# Patient Record
Sex: FEMALE | Race: WHITE | NOT HISPANIC OR LATINO | URBAN - METROPOLITAN AREA
[De-identification: names, ages, dates, MRNs, and addresses within clinical notes are randomized per-mention and may not be internally consistent; named-entity substitution may affect disease eponyms.]

---

## 2023-08-31 ENCOUNTER — HOSPITAL ENCOUNTER (OUTPATIENT)
Dept: DATA CONVERSION | Facility: HOSPITAL | Age: 38
End: 2023-08-31

## 2023-08-31 DIAGNOSIS — R92.2 INCONCLUSIVE MAMMOGRAM: ICD-10-CM

## 2023-08-31 DIAGNOSIS — Z91.89 OTHER SPECIFIED PERSONAL RISK FACTORS, NOT ELSEWHERE CLASSIFIED: ICD-10-CM

## 2023-09-17 ENCOUNTER — HOSPITAL ENCOUNTER (OUTPATIENT)
Dept: DATA CONVERSION | Facility: HOSPITAL | Age: 38
Discharge: HOME | End: 2023-09-17

## 2023-09-17 DIAGNOSIS — K62.89 OTHER SPECIFIED DISEASES OF ANUS AND RECTUM: ICD-10-CM

## 2023-09-17 PROBLEM — F31.9 BIPOLAR AFFECTIVE DISORDER (MULTI): Status: ACTIVE | Noted: 2023-06-05

## 2023-09-17 PROBLEM — I20.9 ANGINA PECTORIS (CMS-HCC): Status: ACTIVE | Noted: 2023-04-06

## 2023-09-17 PROBLEM — F41.9 ANXIETY DISORDER, UNSPECIFIED: Status: ACTIVE | Noted: 2023-09-17

## 2023-09-17 PROBLEM — T50.902A SUICIDE BY DRUG OVERDOSE (MULTI): Status: ACTIVE | Noted: 2023-09-17

## 2023-09-17 PROBLEM — R20.2 PARESTHESIAS IN RIGHT HAND: Status: ACTIVE | Noted: 2023-06-05

## 2023-09-17 PROBLEM — M62.81 MUSCLE WEAKNESS OF RIGHT UPPER EXTREMITY: Status: ACTIVE | Noted: 2023-06-05

## 2023-09-17 PROBLEM — F31.70 BIPOLAR DISORDER IN FULL REMISSION (MULTI): Status: ACTIVE | Noted: 2023-09-17

## 2023-09-17 PROBLEM — M54.2 CERVICALGIA: Status: ACTIVE | Noted: 2023-06-05

## 2023-09-17 PROBLEM — F32.A DEPRESSION, UNSPECIFIED: Status: ACTIVE | Noted: 2023-09-17

## 2023-09-17 PROBLEM — T14.8XXA CONTUSION: Status: RESOLVED | Noted: 2023-09-17 | Resolved: 2023-09-17

## 2023-09-17 LAB
ALBUMIN SERPL-MCNC: 4.4 GM/DL (ref 3.5–5)
ALBUMIN/GLOB SERPL: 1.5 RATIO (ref 1.5–3)
ALP BLD-CCNC: 65 U/L (ref 35–125)
ALT SERPL-CCNC: 16 U/L (ref 5–40)
ANION GAP SERPL CALCULATED.3IONS-SCNC: 9 MMOL/L (ref 0–19)
AST SERPL-CCNC: 17 U/L (ref 5–40)
BACTERIA UR QL AUTO: POSITIVE
BASOPHILS # BLD AUTO: 0.05 K/UL (ref 0–0.22)
BASOPHILS NFR BLD AUTO: 0.6 % (ref 0–1)
BILIRUB SERPL-MCNC: 0.3 MG/DL (ref 0.1–1.2)
BILIRUB UR QL STRIP.AUTO: NEGATIVE
BUN SERPL-MCNC: 9 MG/DL (ref 8–25)
BUN/CREAT SERPL: 12.9 RATIO (ref 8–21)
CALCIUM SERPL-MCNC: 9.3 MG/DL (ref 8.5–10.4)
CHLORIDE SERPL-SCNC: 102 MMOL/L (ref 97–107)
CLARITY UR: ABNORMAL
CO2 SERPL-SCNC: 26 MMOL/L (ref 24–31)
COLOR UR: ABNORMAL
CREAT SERPL-MCNC: 0.7 MG/DL (ref 0.4–1.6)
DEPRECATED RDW RBC AUTO: 42 FL (ref 37–54)
DIFFERENTIAL METHOD BLD: ABNORMAL
EOSINOPHIL # BLD AUTO: 0.19 K/UL (ref 0–0.45)
EOSINOPHIL NFR BLD: 2.4 % (ref 0–3)
ERYTHROCYTE [DISTWIDTH] IN BLOOD BY AUTOMATED COUNT: 12.6 % (ref 11.7–15)
GFR SERPL CREATININE-BSD FRML MDRD: 115 ML/MIN/1.73 M2
GLOBULIN SER-MCNC: 2.9 G/DL (ref 1.9–3.7)
GLUCOSE SERPL-MCNC: 95 MG/DL (ref 65–99)
GLUCOSE UR STRIP.AUTO-MCNC: NEGATIVE MG/DL
HCG UR QL: NEGATIVE
HCT VFR BLD AUTO: 43.8 % (ref 36–44)
HGB BLD-MCNC: 15.2 GM/DL (ref 12–15)
HGB UR QL STRIP.AUTO: 5 /HPF (ref 0–3)
HGB UR QL: NEGATIVE
HYALINE CASTS UR QL AUTO: ABNORMAL /LPF
IMM GRANULOCYTES # BLD AUTO: 0.02 K/UL (ref 0–0.1)
KETONES UR QL STRIP.AUTO: NEGATIVE
LEUKOCYTE ESTERASE UR QL STRIP.AUTO: NEGATIVE
LYMPHOCYTES # BLD AUTO: 2.61 K/UL (ref 1.2–3.2)
LYMPHOCYTES NFR BLD MANUAL: 32.6 % (ref 20–40)
MCH RBC QN AUTO: 31.7 PG (ref 26–34)
MCHC RBC AUTO-ENTMCNC: 34.7 % (ref 31–37)
MCV RBC AUTO: 91.3 FL (ref 80–100)
MICRO URNS: ABNORMAL
MICROSCOPIC (UA): ABNORMAL
MONOCYTES # BLD AUTO: 0.64 K/UL (ref 0–0.8)
MONOCYTES NFR BLD MANUAL: 8 % (ref 0–8)
NEUTROPHILS # BLD AUTO: 4.5 K/UL
NEUTROPHILS # BLD AUTO: 4.5 K/UL (ref 1.8–7.7)
NEUTROPHILS.IMMATURE NFR BLD: 0.2 % (ref 0–1)
NEUTS SEG NFR BLD: 56.2 % (ref 50–70)
NITRITE UR QL STRIP.AUTO: NEGATIVE
NRBC BLD-RTO: 0 /100 WBC
PH UR STRIP.AUTO: 6.5 [PH] (ref 4.6–8)
PLATELET # BLD AUTO: 183 K/UL (ref 150–450)
PMV BLD AUTO: 10.1 CU (ref 7–12.6)
POTASSIUM SERPL-SCNC: 4.2 MMOL/L (ref 3.4–5.1)
PROT SERPL-MCNC: 7.3 G/DL (ref 5.9–7.9)
PROT UR STRIP.AUTO-MCNC: NEGATIVE MG/DL
RBC # BLD AUTO: 4.8 M/UL (ref 4–4.9)
SODIUM SERPL-SCNC: 137 MMOL/L (ref 133–145)
SP GR UR STRIP.AUTO: 1.01 (ref 1–1.03)
SQUAMOUS UR QL AUTO: ABNORMAL /HPF
URINE CULTURE: ABNORMAL
UROBILINOGEN UR QL STRIP.AUTO: NORMAL MG/DL (ref 0–1)
WBC # BLD AUTO: 8 K/UL (ref 4.5–11)
WBC #/AREA URNS AUTO: 3 /HPF (ref 0–3)

## 2023-09-17 RX ORDER — ETONOGESTREL 68 MG/1
IMPLANT SUBCUTANEOUS
COMMUNITY
Start: 2022-07-26 | End: 2025-07-26

## 2023-09-17 RX ORDER — CALCIUM CARBONATE 500(1250)
1 TABLET ORAL
COMMUNITY

## 2023-09-17 RX ORDER — TURMERIC ROOT EXTRACT 500 MG
TABLET ORAL
COMMUNITY

## 2023-09-17 RX ORDER — BIOTIN 10 MG
1 TABLET ORAL DAILY
COMMUNITY

## 2023-09-17 RX ORDER — ZINC GLUCONATE 100 MG
1 TABLET ORAL DAILY
COMMUNITY

## 2023-09-17 RX ORDER — GLUCOSAM/CHONDRO/HERB 149/HYAL 750-100 MG
1 TABLET ORAL DAILY
COMMUNITY

## 2023-09-17 RX ORDER — CALCIUM CARBONATE 600 MG
1200 TABLET ORAL 3 TIMES DAILY
COMMUNITY
Start: 2023-04-06

## 2023-09-18 ENCOUNTER — HOSPITAL ENCOUNTER (OUTPATIENT)
Dept: DATA CONVERSION | Facility: HOSPITAL | Age: 38
Discharge: HOME | End: 2023-09-18

## 2023-09-18 DIAGNOSIS — R33.9 RETENTION OF URINE, UNSPECIFIED: ICD-10-CM

## 2023-09-18 DIAGNOSIS — F17.210 NICOTINE DEPENDENCE, CIGARETTES, UNCOMPLICATED: ICD-10-CM

## 2023-09-18 DIAGNOSIS — K62.89 OTHER SPECIFIED DISEASES OF ANUS AND RECTUM: ICD-10-CM

## 2023-09-18 DIAGNOSIS — K60.2 ANAL FISSURE, UNSPECIFIED: ICD-10-CM

## 2023-09-18 LAB
BACTERIA UR QL AUTO: NEGATIVE
BILIRUB UR QL STRIP.AUTO: NEGATIVE
C TRACH RRNA SPEC QL NAA+PROBE: NORMAL
CLARITY UR: CLEAR
COLOR UR: YELLOW
DRUG SCREEN COMMENT UR-IMP: NORMAL
GLUCOSE UR STRIP.AUTO-MCNC: NEGATIVE MG/DL
HCG UR QL: NEGATIVE
HGB UR QL STRIP.AUTO: 2 /HPF (ref 0–3)
HGB UR QL: NEGATIVE
HYALINE CASTS UR QL AUTO: ABNORMAL /LPF
KETONES UR QL STRIP.AUTO: ABNORMAL
LEUKOCYTE ESTERASE UR QL STRIP.AUTO: NEGATIVE
MICROSCOPIC (UA): ABNORMAL
N GONORRHOEA RRNA SPEC QL NAA+PROBE: NORMAL
NITRITE UR QL STRIP.AUTO: NEGATIVE
PH UR STRIP.AUTO: 5.5 [PH] (ref 4.6–8)
PROT UR STRIP.AUTO-MCNC: NEGATIVE MG/DL
SP GR UR STRIP.AUTO: 1.02 (ref 1–1.03)
SPECIMEN SOURCE: NORMAL
SQUAMOUS UR QL AUTO: ABNORMAL /HPF
T VAGINALIS GENITAL QL WET PREP: NEGATIVE
T VAGINALIS GENITAL QL WET PREP: NEGATIVE
URINE CULTURE: ABNORMAL
UROBILINOGEN UR QL STRIP.AUTO: NORMAL MG/DL (ref 0–1)
WBC #/AREA URNS AUTO: 1 /HPF (ref 0–3)
WBC VAG QL WET PREP: ABNORMAL
YEAST GENITAL QL WET PREP: NEGATIVE

## 2023-09-22 ENCOUNTER — HOSPITAL ENCOUNTER (OUTPATIENT)
Dept: DATA CONVERSION | Facility: HOSPITAL | Age: 38
Discharge: HOME | End: 2023-09-22

## 2023-09-22 DIAGNOSIS — K64.9 UNSPECIFIED HEMORRHOIDS: ICD-10-CM

## 2023-09-22 DIAGNOSIS — K60.2 ANAL FISSURE, UNSPECIFIED: ICD-10-CM

## 2023-09-22 DIAGNOSIS — F17.210 NICOTINE DEPENDENCE, CIGARETTES, UNCOMPLICATED: ICD-10-CM

## 2023-09-22 LAB — HCG SERPL-ACNC: 1 MIU/ML

## 2023-10-11 ENCOUNTER — APPOINTMENT (OUTPATIENT)
Dept: PAIN MEDICINE | Facility: CLINIC | Age: 38
End: 2023-10-11

## 2023-10-20 ENCOUNTER — APPOINTMENT (OUTPATIENT)
Dept: SURGERY | Facility: CLINIC | Age: 38
End: 2023-10-20

## 2025-01-14 ENCOUNTER — APPOINTMENT (OUTPATIENT)
Dept: ORTHOPEDIC SURGERY | Facility: CLINIC | Age: 40
End: 2025-01-14
Payer: MEDICAID

## 2025-01-14 DIAGNOSIS — M53.3 TRAUMATIC COCCYDYNIA: Primary | ICD-10-CM

## 2025-01-14 PROCEDURE — 99204 OFFICE O/P NEW MOD 45 MIN: CPT

## 2025-01-14 NOTE — PROGRESS NOTES
HPI:  Becca Joshi is a 39-year-old female who was referred here by urgent care for a coccyx fracture.  1 week ago she fell on the stairs and landed on her buttocks.  She then presented to urgent care and was diagnosed with a coccyx fracture.  Currently, she describes soreness, some tingling, and some burning around her coccyx.  No pain or numbness and tingling radiating into the legs.  She is ambulating and tolerating walking well.  She has been using over-the-counter medications to treat the pain.  She does have a donut pillow at home.  No other questions or concerns at time of visit.    ROS:  Reviewed on EMR and patient intake sheet.    PMH/SH:  Reviewed on EMR and patient intake sheet.    Exam:  MSK: Full strength range of motion of lower extremities bilaterally.  Full flexion, extension of lumbar spine with minimal pain.  No acute tenderness to lower lumbar spine, sacrum, coccyx which elicits a sharp pain.  General: No acute distress. Awake and conversant.  Eyes: Normal conjunctiva, anicteric. Round symmetric pupils.  ENT: Hearing grossly intact. No nasal discharge.  Neck: Neck is supple. No masses or thyromegaly.  Respiratory: Respirations are non-labored. No wheezing.  Skin: Warm. No rashes or ulcers.  Psych: Alert and oriented. Cooperative, appropriate mood and affect, normal judgement.  CV: No lower extremity edema.  Neuro: Sensation and CN II-XII grossly normal.    Radiology:     X-rays of sacrum/coccyx from 1/5/2025 were uploaded and personally reviewed.  No obvious signs of sacrum or coccyx fracture identified.    Diagnosis:    Coccydynia  Coccyx contusion    Assessment and Plan:  Patient was seen today evaluated for a potential coccyx fracture.  She was referred here by urgent care.  At this time, based on her x-rays and physical exam findings, I believe she has a contusion/strain of the coccyx region.  We discussed conservative management of ambulating as tolerated, use of over-the-counter  anti-inflammatories and Tylenol, use of a donut pillow, and frequent ambulation when sitting for long periods of time.  I educated the patient that these slowly improve over time, often after 4 to 6 weeks.  She may slowly resume activity and exercise as tolerated.  She was recommended to follow-up sooner if her pain becomes more intense, she has an inability to sit, stand, ambulate, or if she is in debilitating pain despite oral analgesics.  Patient feels her questions were answered today.  Patient agrees to the plan above.    **This note was dictated using speech recognition software and was not corrected for spelling or grammatical errors**    Elieser Lea PA-C  Department of Orthopaedic Surgery  8:56 AM  01/14/25    [address] [place] [address]    Voicemail: [phone]   Appts: [phone]  Fax: [phone]

## 2025-07-31 ENCOUNTER — APPOINTMENT (OUTPATIENT)
Dept: CARDIOLOGY | Facility: HOSPITAL | Age: 40
End: 2025-07-31
Payer: MEDICAID

## 2025-07-31 ENCOUNTER — HOSPITAL ENCOUNTER (INPATIENT)
Facility: HOSPITAL | Age: 40
End: 2025-07-31
Attending: PSYCHIATRY & NEUROLOGY | Admitting: PSYCHIATRY & NEUROLOGY
Payer: MEDICAID

## 2025-07-31 ENCOUNTER — HOSPITAL ENCOUNTER (EMERGENCY)
Facility: HOSPITAL | Age: 40
Discharge: PSYCHIATRIC OR SUBSTANCE USE FACILITY | End: 2025-07-31
Attending: EMERGENCY MEDICINE
Payer: MEDICAID

## 2025-07-31 VITALS
TEMPERATURE: 99.1 F | OXYGEN SATURATION: 99 % | RESPIRATION RATE: 18 BRPM | DIASTOLIC BLOOD PRESSURE: 71 MMHG | HEART RATE: 98 BPM | SYSTOLIC BLOOD PRESSURE: 118 MMHG | WEIGHT: 150 LBS | BODY MASS INDEX: 26.58 KG/M2 | HEIGHT: 63 IN

## 2025-07-31 DIAGNOSIS — N30.90 BLADDER INFECTION: ICD-10-CM

## 2025-07-31 DIAGNOSIS — F32.A DEPRESSION, UNSPECIFIED DEPRESSION TYPE: ICD-10-CM

## 2025-07-31 DIAGNOSIS — R45.851 SUICIDAL IDEATION: Primary | ICD-10-CM

## 2025-07-31 DIAGNOSIS — Z00.00 HEALTHCARE MAINTENANCE: ICD-10-CM

## 2025-07-31 DIAGNOSIS — F17.200 TOBACCO USE DISORDER: Primary | ICD-10-CM

## 2025-07-31 PROBLEM — R82.71 ASYMPTOMATIC BACTERIURIA: Status: RESOLVED | Noted: 2025-07-31 | Resolved: 2025-07-31

## 2025-07-31 PROBLEM — L91.0 KELOID OF SKIN: Status: ACTIVE | Noted: 2024-04-16

## 2025-07-31 PROBLEM — R82.71 ASYMPTOMATIC BACTERIURIA: Status: ACTIVE | Noted: 2025-07-31

## 2025-07-31 PROBLEM — F31.32 BIPOLAR AFFECTIVE DISORDER, CURRENTLY DEPRESSED, MODERATE (MULTI): Chronic | Status: ACTIVE | Noted: 2023-06-05

## 2025-07-31 PROBLEM — N30.00 ACUTE CYSTITIS WITHOUT HEMATURIA: Status: ACTIVE | Noted: 2025-07-31

## 2025-07-31 PROBLEM — K59.09 CHRONIC CONSTIPATION: Status: ACTIVE | Noted: 2023-12-14

## 2025-07-31 PROBLEM — F12.10 CANNABIS USE DISORDER, MILD: Status: ACTIVE | Noted: 2025-04-24

## 2025-07-31 PROBLEM — R20.2 INTERMITTENT PARESTHESIA OF HAND AND FOOT: Status: ACTIVE | Noted: 2024-07-03

## 2025-07-31 PROBLEM — K60.2 ANAL FISSURE: Status: ACTIVE | Noted: 2025-07-31

## 2025-07-31 PROBLEM — J30.2 SEASONAL ALLERGIES: Status: ACTIVE | Noted: 2025-05-13

## 2025-07-31 PROBLEM — F90.2 ATTENTION DEFICIT HYPERACTIVITY DISORDER (ADHD), COMBINED TYPE: Status: ACTIVE | Noted: 2025-04-24

## 2025-07-31 PROBLEM — R45.89 AT RISK FOR SUICIDE: Status: ACTIVE | Noted: 2025-06-11

## 2025-07-31 PROBLEM — Z97.5 IUD (INTRAUTERINE DEVICE) IN PLACE: Status: ACTIVE | Noted: 2024-04-16

## 2025-07-31 LAB
ALBUMIN SERPL BCP-MCNC: 4.5 G/DL (ref 3.4–5)
ALP SERPL-CCNC: 41 U/L (ref 33–110)
ALT SERPL W P-5'-P-CCNC: 9 U/L (ref 7–45)
AMPHETAMINES UR QL SCN: ABNORMAL
ANION GAP SERPL CALC-SCNC: 14 MMOL/L (ref 10–20)
APAP SERPL-MCNC: <10 UG/ML (ref ?–30)
APPEARANCE UR: CLEAR
AST SERPL W P-5'-P-CCNC: 13 U/L (ref 9–39)
B-HCG SERPL-ACNC: <2 MIU/ML
BACTERIA #/AREA URNS AUTO: ABNORMAL /HPF
BARBITURATES UR QL SCN: ABNORMAL
BASOPHILS # BLD AUTO: 0.04 X10*3/UL (ref 0–0.1)
BASOPHILS NFR BLD AUTO: 0.4 %
BENZODIAZ UR QL SCN: ABNORMAL
BILIRUB SERPL-MCNC: 0.7 MG/DL (ref 0–1.2)
BILIRUB UR STRIP.AUTO-MCNC: NEGATIVE MG/DL
BUN SERPL-MCNC: 9 MG/DL (ref 6–23)
BZE UR QL SCN: ABNORMAL
CALCIUM SERPL-MCNC: 9 MG/DL (ref 8.6–10.3)
CANNABINOIDS UR QL SCN: ABNORMAL
CHLORIDE SERPL-SCNC: 106 MMOL/L (ref 98–107)
CO2 SERPL-SCNC: 23 MMOL/L (ref 21–32)
COLOR UR: YELLOW
CREAT SERPL-MCNC: 0.89 MG/DL (ref 0.5–1.05)
EGFRCR SERPLBLD CKD-EPI 2021: 86 ML/MIN/1.73M*2
EOSINOPHIL # BLD AUTO: 0.11 X10*3/UL (ref 0–0.7)
EOSINOPHIL NFR BLD AUTO: 1 %
ERYTHROCYTE [DISTWIDTH] IN BLOOD BY AUTOMATED COUNT: 11.9 % (ref 11.5–14.5)
ETHANOL SERPL-MCNC: <10 MG/DL
FENTANYL+NORFENTANYL UR QL SCN: ABNORMAL
GLUCOSE SERPL-MCNC: 107 MG/DL (ref 74–99)
GLUCOSE UR STRIP.AUTO-MCNC: NORMAL MG/DL
HCT VFR BLD AUTO: 41.6 % (ref 36–46)
HGB BLD-MCNC: 14.2 G/DL (ref 12–16)
IMM GRANULOCYTES # BLD AUTO: 0.06 X10*3/UL (ref 0–0.7)
IMM GRANULOCYTES NFR BLD AUTO: 0.5 % (ref 0–0.9)
KETONES UR STRIP.AUTO-MCNC: ABNORMAL MG/DL
LEUKOCYTE ESTERASE UR QL STRIP.AUTO: ABNORMAL
LYMPHOCYTES # BLD AUTO: 2.19 X10*3/UL (ref 1.2–4.8)
LYMPHOCYTES NFR BLD AUTO: 19.6 %
MCH RBC QN AUTO: 30.7 PG (ref 26–34)
MCHC RBC AUTO-ENTMCNC: 34.1 G/DL (ref 32–36)
MCV RBC AUTO: 90 FL (ref 80–100)
METHADONE UR QL SCN: ABNORMAL
MONOCYTES # BLD AUTO: 0.79 X10*3/UL (ref 0.1–1)
MONOCYTES NFR BLD AUTO: 7.1 %
NEUTROPHILS # BLD AUTO: 7.98 X10*3/UL (ref 1.2–7.7)
NEUTROPHILS NFR BLD AUTO: 71.4 %
NITRITE UR QL STRIP.AUTO: NEGATIVE
NRBC BLD-RTO: 0 /100 WBCS (ref 0–0)
OPIATES UR QL SCN: ABNORMAL
OXYCODONE+OXYMORPHONE UR QL SCN: ABNORMAL
PCP UR QL SCN: ABNORMAL
PH UR STRIP.AUTO: 6 [PH]
PLATELET # BLD AUTO: 200 X10*3/UL (ref 150–450)
POTASSIUM SERPL-SCNC: 3.6 MMOL/L (ref 3.5–5.3)
PROT SERPL-MCNC: 6.8 G/DL (ref 6.4–8.2)
PROT UR STRIP.AUTO-MCNC: ABNORMAL MG/DL
RBC # BLD AUTO: 4.63 X10*6/UL (ref 4–5.2)
RBC # UR STRIP.AUTO: NEGATIVE MG/DL
RBC #/AREA URNS AUTO: ABNORMAL /HPF
SALICYLATES SERPL-MCNC: <3 MG/DL (ref ?–20)
SODIUM SERPL-SCNC: 139 MMOL/L (ref 136–145)
SP GR UR STRIP.AUTO: 1.03
SQUAMOUS #/AREA URNS AUTO: ABNORMAL /HPF
UROBILINOGEN UR STRIP.AUTO-MCNC: ABNORMAL MG/DL
WBC # BLD AUTO: 11.2 X10*3/UL (ref 4.4–11.3)
WBC #/AREA URNS AUTO: ABNORMAL /HPF

## 2025-07-31 PROCEDURE — 87086 URINE CULTURE/COLONY COUNT: CPT | Mod: PARLAB | Performed by: NURSE PRACTITIONER

## 2025-07-31 PROCEDURE — 80053 COMPREHEN METABOLIC PANEL: CPT | Performed by: NURSE PRACTITIONER

## 2025-07-31 PROCEDURE — 80307 DRUG TEST PRSMV CHEM ANLYZR: CPT | Performed by: NURSE PRACTITIONER

## 2025-07-31 PROCEDURE — 1240000001 HC SEMI-PRIVATE BH ROOM DAILY

## 2025-07-31 PROCEDURE — 85025 COMPLETE CBC W/AUTO DIFF WBC: CPT | Performed by: NURSE PRACTITIONER

## 2025-07-31 PROCEDURE — 80143 DRUG ASSAY ACETAMINOPHEN: CPT | Performed by: NURSE PRACTITIONER

## 2025-07-31 PROCEDURE — 99253 IP/OBS CNSLTJ NEW/EST LOW 45: CPT | Performed by: INTERNAL MEDICINE

## 2025-07-31 PROCEDURE — 84443 ASSAY THYROID STIM HORMONE: CPT

## 2025-07-31 PROCEDURE — 84702 CHORIONIC GONADOTROPIN TEST: CPT | Performed by: NURSE PRACTITIONER

## 2025-07-31 PROCEDURE — 81001 URINALYSIS AUTO W/SCOPE: CPT | Mod: 59 | Performed by: NURSE PRACTITIONER

## 2025-07-31 PROCEDURE — 93005 ELECTROCARDIOGRAM TRACING: CPT

## 2025-07-31 PROCEDURE — 2500000001 HC RX 250 WO HCPCS SELF ADMINISTERED DRUGS (ALT 637 FOR MEDICARE OP): Performed by: INTERNAL MEDICINE

## 2025-07-31 PROCEDURE — 2500000001 HC RX 250 WO HCPCS SELF ADMINISTERED DRUGS (ALT 637 FOR MEDICARE OP): Performed by: EMERGENCY MEDICINE

## 2025-07-31 PROCEDURE — 99285 EMERGENCY DEPT VISIT HI MDM: CPT | Performed by: EMERGENCY MEDICINE

## 2025-07-31 PROCEDURE — 36415 COLL VENOUS BLD VENIPUNCTURE: CPT | Performed by: NURSE PRACTITIONER

## 2025-07-31 RX ORDER — MIRTAZAPINE 7.5 MG/1
7.5 TABLET, FILM COATED ORAL NIGHTLY
COMMUNITY
Start: 2025-07-17

## 2025-07-31 RX ORDER — FLUTICASONE PROPIONATE 50 MCG
2 SPRAY, SUSPENSION (ML) NASAL
COMMUNITY
Start: 2025-05-01

## 2025-07-31 RX ORDER — LORAZEPAM 1 MG/1
2 TABLET ORAL EVERY 6 HOURS PRN
Status: DISCONTINUED | OUTPATIENT
Start: 2025-07-31 | End: 2025-08-04 | Stop reason: HOSPADM

## 2025-07-31 RX ORDER — DIPHENHYDRAMINE HCL 50 MG
50 CAPSULE ORAL EVERY 6 HOURS PRN
Status: DISCONTINUED | OUTPATIENT
Start: 2025-07-31 | End: 2025-08-04 | Stop reason: HOSPADM

## 2025-07-31 RX ORDER — DIPHENHYDRAMINE HYDROCHLORIDE 50 MG/ML
50 INJECTION, SOLUTION INTRAMUSCULAR; INTRAVENOUS ONCE AS NEEDED
Status: DISCONTINUED | OUTPATIENT
Start: 2025-07-31 | End: 2025-08-04 | Stop reason: HOSPADM

## 2025-07-31 RX ORDER — HALOPERIDOL LACTATE 5 MG/ML
5 INJECTION, SOLUTION INTRAMUSCULAR EVERY 6 HOURS PRN
Status: DISCONTINUED | OUTPATIENT
Start: 2025-07-31 | End: 2025-08-04 | Stop reason: HOSPADM

## 2025-07-31 RX ORDER — NICOTINE 7MG/24HR
1 PATCH, TRANSDERMAL 24 HOURS TRANSDERMAL DAILY
Status: DISCONTINUED | OUTPATIENT
Start: 2025-09-25 | End: 2025-08-04 | Stop reason: HOSPADM

## 2025-07-31 RX ORDER — DEXTROAMPHETAMINE SACCHARATE, AMPHETAMINE ASPARTATE MONOHYDRATE, DEXTROAMPHETAMINE SULFATE AND AMPHETAMINE SULFATE 5; 5; 5; 5 MG/1; MG/1; MG/1; MG/1
20 CAPSULE, EXTENDED RELEASE ORAL
COMMUNITY
Start: 2025-06-12

## 2025-07-31 RX ORDER — IBUPROFEN 600 MG/1
600 TABLET, FILM COATED ORAL EVERY 8 HOURS PRN
Status: DISCONTINUED | OUTPATIENT
Start: 2025-07-31 | End: 2025-08-04 | Stop reason: HOSPADM

## 2025-07-31 RX ORDER — HALOPERIDOL 5 MG/1
10 TABLET ORAL EVERY 6 HOURS PRN
Status: DISCONTINUED | OUTPATIENT
Start: 2025-07-31 | End: 2025-08-04 | Stop reason: HOSPADM

## 2025-07-31 RX ORDER — HALOPERIDOL 5 MG/1
5 TABLET ORAL EVERY 6 HOURS PRN
Status: DISCONTINUED | OUTPATIENT
Start: 2025-07-31 | End: 2025-08-04 | Stop reason: HOSPADM

## 2025-07-31 RX ORDER — ALUMINUM HYDROXIDE, MAGNESIUM HYDROXIDE, AND SIMETHICONE 1200; 120; 1200 MG/30ML; MG/30ML; MG/30ML
30 SUSPENSION ORAL EVERY 6 HOURS PRN
Status: DISCONTINUED | OUTPATIENT
Start: 2025-07-31 | End: 2025-08-04 | Stop reason: HOSPADM

## 2025-07-31 RX ORDER — LORAZEPAM 0.5 MG/1
1 TABLET ORAL ONCE
Status: COMPLETED | OUTPATIENT
Start: 2025-07-31 | End: 2025-07-31

## 2025-07-31 RX ORDER — MICONAZOLE NITRATE 2 %
2 CREAM (GRAM) TOPICAL EVERY 2 HOUR PRN
Status: DISCONTINUED | OUTPATIENT
Start: 2025-07-31 | End: 2025-08-04 | Stop reason: HOSPADM

## 2025-07-31 RX ORDER — MIDAZOLAM HYDROCHLORIDE 1 MG/ML
2 INJECTION, SOLUTION INTRAMUSCULAR; INTRAVENOUS EVERY 6 HOURS PRN
Status: DISCONTINUED | OUTPATIENT
Start: 2025-07-31 | End: 2025-08-04 | Stop reason: HOSPADM

## 2025-07-31 RX ORDER — MULTIVIT-MIN/IRON FUM/FOLIC AC 7.5 MG-4
1 TABLET ORAL DAILY
Status: ON HOLD | COMMUNITY
End: 2025-08-04

## 2025-07-31 RX ORDER — LORATADINE 10 MG/1
10 TABLET ORAL DAILY PRN
COMMUNITY
Start: 2025-05-01

## 2025-07-31 RX ORDER — FLUOXETINE 20 MG/1
20 CAPSULE ORAL
COMMUNITY
Start: 2025-06-12

## 2025-07-31 RX ORDER — ASCORBIC ACID 500 MG
500 TABLET ORAL DAILY
COMMUNITY

## 2025-07-31 RX ORDER — OLOPATADINE HYDROCHLORIDE 1 MG/ML
1 SOLUTION OPHTHALMIC 2 TIMES DAILY
COMMUNITY
Start: 2025-06-03

## 2025-07-31 RX ORDER — HYDROXYZINE HYDROCHLORIDE 25 MG/1
50 TABLET, FILM COATED ORAL EVERY 6 HOURS PRN
Status: DISCONTINUED | OUTPATIENT
Start: 2025-07-31 | End: 2025-08-01

## 2025-07-31 RX ORDER — ZINC GLUCONATE 50 MG
50 TABLET ORAL DAILY
COMMUNITY

## 2025-07-31 RX ORDER — OXYBUTYNIN CHLORIDE 5 MG/1
5 TABLET, EXTENDED RELEASE ORAL
COMMUNITY
Start: 2025-05-07

## 2025-07-31 RX ORDER — IBUPROFEN 200 MG
1 TABLET ORAL DAILY
Status: DISCONTINUED | OUTPATIENT
Start: 2025-09-11 | End: 2025-08-04 | Stop reason: HOSPADM

## 2025-07-31 RX ORDER — HALOPERIDOL LACTATE 5 MG/ML
10 INJECTION, SOLUTION INTRAMUSCULAR EVERY 6 HOURS PRN
Status: DISCONTINUED | OUTPATIENT
Start: 2025-07-31 | End: 2025-08-04 | Stop reason: HOSPADM

## 2025-07-31 RX ORDER — IBUPROFEN 200 MG
1 TABLET ORAL DAILY
Status: DISCONTINUED | OUTPATIENT
Start: 2025-07-31 | End: 2025-08-04 | Stop reason: HOSPADM

## 2025-07-31 RX ORDER — POLYETHYLENE GLYCOL 3350 17 G/17G
17 POWDER, FOR SOLUTION ORAL DAILY PRN
Status: DISCONTINUED | OUTPATIENT
Start: 2025-07-31 | End: 2025-08-04 | Stop reason: HOSPADM

## 2025-07-31 RX ORDER — CEFUROXIME AXETIL 250 MG/1
250 TABLET ORAL 2 TIMES DAILY
Status: DISCONTINUED | OUTPATIENT
Start: 2025-07-31 | End: 2025-08-04 | Stop reason: HOSPADM

## 2025-07-31 RX ORDER — HYDROXYZINE HYDROCHLORIDE 25 MG/1
25-50 TABLET, FILM COATED ORAL EVERY 6 HOURS PRN
Status: ON HOLD | COMMUNITY
Start: 2025-05-23 | End: 2025-08-01 | Stop reason: DRUGHIGH

## 2025-07-31 RX ADMIN — CEFUROXIME AXETIL 250 MG: 250 TABLET ORAL at 21:32

## 2025-07-31 RX ADMIN — LORAZEPAM 1 MG: 0.5 TABLET ORAL at 15:22

## 2025-07-31 SDOH — HEALTH STABILITY: MENTAL HEALTH: HAVE YOU ACTUALLY HAD ANY THOUGHTS OF KILLING YOURSELF?: NO

## 2025-07-31 SDOH — HEALTH STABILITY: MENTAL HEALTH: ACTIVE SUICIDAL IDEATION WITH SPECIFIC PLAN AND INTENT (PAST 1 MONTH): NO

## 2025-07-31 SDOH — ECONOMIC STABILITY: FOOD INSECURITY: WITHIN THE PAST 12 MONTHS, THE FOOD YOU BOUGHT JUST DIDN'T LAST AND YOU DIDN'T HAVE MONEY TO GET MORE.: NEVER TRUE

## 2025-07-31 SDOH — HEALTH STABILITY: MENTAL HEALTH: SLEEP PATTERN: OTHER (COMMENT)

## 2025-07-31 SDOH — HEALTH STABILITY: MENTAL HEALTH: BEHAVIORAL HEALTH(WDL): WITHIN DEFINED LIMITS

## 2025-07-31 SDOH — HEALTH STABILITY: PHYSICAL HEALTH: PATIENT ACTIVITY: SLEEPING

## 2025-07-31 SDOH — HEALTH STABILITY: MENTAL HEALTH
COGNITION: APPROPRIATE ATTENTION/CONCENTRATION;APPROPRIATE FOR DEVELOPMENTAL AGE;APPROPRIATE SAFETY AWARENESS;FOLLOWS COMMANDS

## 2025-07-31 SDOH — HEALTH STABILITY: MENTAL HEALTH: NON-SPECIFIC ACTIVE SUICIDAL THOUGHTS (PAST 1 MONTH): YES

## 2025-07-31 SDOH — ECONOMIC STABILITY: FOOD INSECURITY: WITHIN THE PAST 12 MONTHS, YOU WORRIED THAT YOUR FOOD WOULD RUN OUT BEFORE YOU GOT THE MONEY TO BUY MORE.: NEVER TRUE

## 2025-07-31 SDOH — HEALTH STABILITY: MENTAL HEALTH: WISH TO BE DEAD (PAST 1 MONTH): YES

## 2025-07-31 SDOH — HEALTH STABILITY: MENTAL HEALTH: SUICIDAL BEHAVIOR (3 MONTHS): NO

## 2025-07-31 SDOH — SOCIAL STABILITY: SOCIAL INSECURITY: ARE YOU MARRIED, WIDOWED, DIVORCED, SEPARATED, NEVER MARRIED, OR LIVING WITH A PARTNER?: NEVER MARRIED

## 2025-07-31 SDOH — HEALTH STABILITY: MENTAL HEALTH: BEHAVIORS/MOOD: CALM;COOPERATIVE;SLEEPING

## 2025-07-31 SDOH — ECONOMIC STABILITY: INCOME INSECURITY: IN THE PAST 12 MONTHS HAS THE ELECTRIC, GAS, OIL, OR WATER COMPANY THREATENED TO SHUT OFF SERVICES IN YOUR HOME?: NO

## 2025-07-31 SDOH — HEALTH STABILITY: MENTAL HEALTH
DO YOU FEEL STRESS - TENSE, RESTLESS, NERVOUS, OR ANXIOUS, OR UNABLE TO SLEEP AT NIGHT BECAUSE YOUR MIND IS TROUBLED ALL THE TIME - THESE DAYS?: RATHER MUCH

## 2025-07-31 SDOH — HEALTH STABILITY: PHYSICAL HEALTH
HOW OFTEN DO YOU NEED TO HAVE SOMEONE HELP YOU WHEN YOU READ INSTRUCTIONS, PAMPHLETS, OR OTHER WRITTEN MATERIAL FROM YOUR DOCTOR OR PHARMACY?: NEVER

## 2025-07-31 SDOH — SOCIAL STABILITY: SOCIAL INSECURITY: HAVE YOU HAD THOUGHTS OF HARMING ANYONE ELSE?: NO

## 2025-07-31 SDOH — HEALTH STABILITY: MENTAL HEALTH: EXPERIENCED ANY OF THE FOLLOWING LIFE EVENTS: SOCIAL LOSS (BANKRUPTCY, DIVORCE, WORK-RELATED STRESS)

## 2025-07-31 SDOH — SOCIAL STABILITY: SOCIAL NETWORK
DO YOU BELONG TO ANY CLUBS OR ORGANIZATIONS SUCH AS CHURCH GROUPS, UNIONS, FRATERNAL OR ATHLETIC GROUPS, OR SCHOOL GROUPS?: NO

## 2025-07-31 SDOH — SOCIAL STABILITY: SOCIAL INSECURITY: DO YOU FEEL ANYONE HAS EXPLOITED OR TAKEN ADVANTAGE OF YOU FINANCIALLY OR OF YOUR PERSONAL PROPERTY?: NO

## 2025-07-31 SDOH — HEALTH STABILITY: PHYSICAL HEALTH: ON AVERAGE, HOW MANY MINUTES DO YOU ENGAGE IN EXERCISE AT THIS LEVEL?: 10 MIN

## 2025-07-31 SDOH — HEALTH STABILITY: MENTAL HEALTH: BEHAVIORS/MOOD: CALM;COOPERATIVE

## 2025-07-31 SDOH — ECONOMIC STABILITY: HOUSING INSECURITY: AT ANY TIME IN THE PAST 12 MONTHS, WERE YOU HOMELESS OR LIVING IN A SHELTER (INCLUDING NOW)?: NO

## 2025-07-31 SDOH — HEALTH STABILITY: MENTAL HEALTH: HOW MANY DRINKS CONTAINING ALCOHOL DO YOU HAVE ON A TYPICAL DAY WHEN YOU ARE DRINKING?: PATIENT DOES NOT DRINK

## 2025-07-31 SDOH — SOCIAL STABILITY: SOCIAL INSECURITY
WITHIN THE LAST YEAR, HAVE YOU BEEN RAPED OR FORCED TO HAVE ANY KIND OF SEXUAL ACTIVITY BY YOUR PARTNER OR EX-PARTNER?: NO

## 2025-07-31 SDOH — HEALTH STABILITY: MENTAL HEALTH

## 2025-07-31 SDOH — HEALTH STABILITY: MENTAL HEALTH: HAVE YOU WISHED YOU WERE DEAD OR WISHED YOU COULD GO TO SLEEP AND NOT WAKE UP?: NO

## 2025-07-31 SDOH — HEALTH STABILITY: PHYSICAL HEALTH: PATIENT ACTIVITY: AWAKE

## 2025-07-31 SDOH — HEALTH STABILITY: MENTAL HEALTH: HOW OFTEN DO YOU HAVE SIX OR MORE DRINKS ON ONE OCCASION?: NEVER

## 2025-07-31 SDOH — HEALTH STABILITY: MENTAL HEALTH: ACTIVE SUICIDAL IDEATION WITH SOME INTENT TO ACT, WITHOUT SPECIFIC PLAN (PAST 1 MONTH): NO

## 2025-07-31 SDOH — HEALTH STABILITY: MENTAL HEALTH: HAVE YOU EVER DONE ANYTHING, STARTED TO DO ANYTHING, OR PREPARED TO DO ANYTHING TO END YOUR LIFE?: NO

## 2025-07-31 SDOH — HEALTH STABILITY: MENTAL HEALTH: BEHAVIORS/MOOD: SLEEPING

## 2025-07-31 SDOH — SOCIAL STABILITY: SOCIAL INSECURITY
WITHIN THE LAST YEAR, HAVE YOU BEEN KICKED, HIT, SLAPPED, OR OTHERWISE PHYSICALLY HURT BY YOUR PARTNER OR EX-PARTNER?: NO

## 2025-07-31 SDOH — HEALTH STABILITY: MENTAL HEALTH: HAVE YOU EVER TRIED TO KILL YOURSELF?: YES

## 2025-07-31 SDOH — SOCIAL STABILITY: SOCIAL INSECURITY: WITHIN THE LAST YEAR, HAVE YOU BEEN HUMILIATED OR EMOTIONALLY ABUSED IN OTHER WAYS BY YOUR PARTNER OR EX-PARTNER?: NO

## 2025-07-31 SDOH — SOCIAL STABILITY: SOCIAL INSECURITY: WITHIN THE LAST YEAR, HAVE YOU BEEN AFRAID OF YOUR PARTNER OR EX-PARTNER?: NO

## 2025-07-31 SDOH — HEALTH STABILITY: MENTAL HEALTH: SUICIDAL BEHAVIOR (LIFETIME): YES

## 2025-07-31 SDOH — HEALTH STABILITY: MENTAL HEALTH: HOW DID YOU TRY TO KILL YOURSELF?: OVERDOSE

## 2025-07-31 SDOH — HEALTH STABILITY: PHYSICAL HEALTH: ON AVERAGE, HOW MANY DAYS PER WEEK DO YOU ENGAGE IN MODERATE TO STRENUOUS EXERCISE (LIKE A BRISK WALK)?: 5 DAYS

## 2025-07-31 SDOH — HEALTH STABILITY: MENTAL HEALTH: MOOD: UNABLE TO ASSESS

## 2025-07-31 SDOH — SOCIAL STABILITY: SOCIAL NETWORK: IN A TYPICAL WEEK, HOW MANY TIMES DO YOU TALK ON THE PHONE WITH FAMILY, FRIENDS, OR NEIGHBORS?: NEVER

## 2025-07-31 SDOH — HEALTH STABILITY: MENTAL HEALTH: CONFUSION: MODERATE

## 2025-07-31 SDOH — ECONOMIC STABILITY: HOUSING INSECURITY: IN THE PAST 12 MONTHS, HOW MANY TIMES HAVE YOU MOVED WHERE YOU WERE LIVING?: 1

## 2025-07-31 SDOH — SOCIAL STABILITY: SOCIAL INSECURITY: HAVE YOU HAD ANY THOUGHTS OF HARMING ANYONE ELSE?: NO

## 2025-07-31 SDOH — ECONOMIC STABILITY: FOOD INSECURITY: HOW HARD IS IT FOR YOU TO PAY FOR THE VERY BASICS LIKE FOOD, HOUSING, MEDICAL CARE, AND HEATING?: NOT VERY HARD

## 2025-07-31 SDOH — HEALTH STABILITY: MENTAL HEALTH: ANXIETY SYMPTOMS: SOCIAL PHOBIAS;GENERALIZED;PANIC ATTACK

## 2025-07-31 SDOH — ECONOMIC STABILITY: HOUSING INSECURITY: IN THE LAST 12 MONTHS, WAS THERE A TIME WHEN YOU WERE NOT ABLE TO PAY THE MORTGAGE OR RENT ON TIME?: NO

## 2025-07-31 SDOH — SOCIAL STABILITY: SOCIAL NETWORK: HOW OFTEN DO YOU GET TOGETHER WITH FRIENDS OR RELATIVES?: NEVER

## 2025-07-31 SDOH — HEALTH STABILITY: MENTAL HEALTH: HOW OFTEN DO YOU HAVE A DRINK CONTAINING ALCOHOL?: NEVER

## 2025-07-31 SDOH — SOCIAL STABILITY: SOCIAL NETWORK: HOW OFTEN DO YOU ATTEND CHURCH OR RELIGIOUS SERVICES?: NEVER

## 2025-07-31 SDOH — SOCIAL STABILITY: SOCIAL INSECURITY: ARE THERE ANY APPARENT SIGNS OF INJURIES/BEHAVIORS THAT COULD BE RELATED TO ABUSE/NEGLECT?: NO

## 2025-07-31 SDOH — HEALTH STABILITY: MENTAL HEALTH: BEHAVIORS/MOOD: ANXIOUS;CRYING;FEARFUL;GUARDED;SAD;TEARFUL

## 2025-07-31 SDOH — ECONOMIC STABILITY: TRANSPORTATION INSECURITY: IN THE PAST 12 MONTHS, HAS LACK OF TRANSPORTATION KEPT YOU FROM MEDICAL APPOINTMENTS OR FROM GETTING MEDICATIONS?: NO

## 2025-07-31 SDOH — SOCIAL STABILITY: SOCIAL INSECURITY: DOES ANYONE TRY TO KEEP YOU FROM HAVING/CONTACTING OTHER FRIENDS OR DOING THINGS OUTSIDE YOUR HOME?: NO

## 2025-07-31 SDOH — HEALTH STABILITY: MENTAL HEALTH: WHEN DID YOU TRY TO KILL YOURSELF?: AROUND 10 YEARS PRIOR TO ED ARRIVAL.

## 2025-07-31 SDOH — HEALTH STABILITY: MENTAL HEALTH
OTHER SUICIDE PRECAUTIONS INCLUDE: PATIENT PLACED IN AN EASILY OBSERVABLE ROOM WITH DOOR/CURTAIN REMAINING OPEN;PATIENT PLACED IN GOWN (SNAPS OR PAPER GOWNS PREFERRED) AND WANDED;REMAINING RISKS IDENTIFIED AND MITIGATED;PATIENT PLACED IN PSYCH SAFE ROOM (IF AVAILABLE);ELOPEMENT RISK IDENTIFIED;FREQUENT ROUNDING WITH IRREGULAR CHECKS AT MINIMUM OF EVERY 15 MINUTES TO ASSESS PSYCH SAFETY PERFORMED;HOURLY BEHAVIORAL ASSESSMENT PERFORMED;PERSONAL BELONGINGS SECURED;TREATMENT PLAN BASED ON RISK FACTORS DEVELOPED (ED ONLY - IF PATIENT IN ED MORE THAN 8 HOURS);VISITORS LIMITED WHEN NECESSARY AND PERSONAL ITEMS SCREENED

## 2025-07-31 SDOH — HEALTH STABILITY: MENTAL HEALTH: BEHAVIORAL HEALTH(WDL): EXCEPTIONS TO WDL

## 2025-07-31 SDOH — SOCIAL STABILITY: SOCIAL INSECURITY: DO YOU FEEL UNSAFE GOING BACK TO THE PLACE WHERE YOU ARE LIVING?: NO

## 2025-07-31 SDOH — SOCIAL STABILITY: SOCIAL INSECURITY: ABUSE: ADULT

## 2025-07-31 SDOH — HEALTH STABILITY: MENTAL HEALTH: ARE YOU HAVING THOUGHTS OF KILLING YOURSELF RIGHT NOW?: NO

## 2025-07-31 SDOH — HEALTH STABILITY: MENTAL HEALTH: IN THE PAST WEEK, HAVE YOU BEEN HAVING THOUGHTS ABOUT KILLING YOURSELF?: YES

## 2025-07-31 SDOH — SOCIAL STABILITY: SOCIAL NETWORK: EMOTIONAL SUPPORT GIVEN: REASSURE

## 2025-07-31 SDOH — HEALTH STABILITY: MENTAL HEALTH: CONFUSION: OTHER (COMMENT)

## 2025-07-31 SDOH — ECONOMIC STABILITY: HOUSING INSECURITY: FEELS SAFE LIVING IN HOME: YES

## 2025-07-31 SDOH — SOCIAL STABILITY: SOCIAL INSECURITY: ARE YOU OR HAVE YOU BEEN THREATENED OR ABUSED PHYSICALLY, EMOTIONALLY, OR SEXUALLY BY ANYONE?: NO

## 2025-07-31 SDOH — SOCIAL STABILITY: SOCIAL NETWORK: HOW OFTEN DO YOU ATTEND MEETINGS OF THE CLUBS OR ORGANIZATIONS YOU BELONG TO?: NEVER

## 2025-07-31 SDOH — ECONOMIC STABILITY: GENERAL

## 2025-07-31 SDOH — SOCIAL STABILITY: SOCIAL INSECURITY: WERE YOU ABLE TO COMPLETE ALL THE BEHAVIORAL HEALTH SCREENINGS?: YES

## 2025-07-31 SDOH — HEALTH STABILITY: MENTAL HEALTH: IN THE PAST FEW WEEKS, HAVE YOU WISHED YOU WERE DEAD?: YES

## 2025-07-31 SDOH — HEALTH STABILITY: MENTAL HEALTH: SUICIDE ASSESSMENT:: ADULT (C-SSRS)

## 2025-07-31 SDOH — HEALTH STABILITY: MENTAL HEALTH: SUICIDAL BEHAVIOR (DESCRIPTION): OVERDOSE AROUND 10 YEARS PRIOR TO ED ARRIVAL.

## 2025-07-31 SDOH — SOCIAL STABILITY: SOCIAL INSECURITY: FAMILY BEHAVIORS: UNABLE TO ASSESS

## 2025-07-31 SDOH — SOCIAL STABILITY: SOCIAL INSECURITY: HAS ANYONE EVER THREATENED TO HURT YOUR FAMILY OR YOUR PETS?: NO

## 2025-07-31 SDOH — HEALTH STABILITY: MENTAL HEALTH: MOOD: ANXIOUS;DEPRESSED;DESPAIRING;FEARFUL;SAD

## 2025-07-31 SDOH — HEALTH STABILITY: MENTAL HEALTH: DELUSIONS: PARANOID

## 2025-07-31 SDOH — HEALTH STABILITY: MENTAL HEALTH: BEHAVIORS/MOOD: ANXIOUS;CALM;COOPERATIVE

## 2025-07-31 SDOH — HEALTH STABILITY: MENTAL HEALTH: IN THE PAST FEW WEEKS, HAVE YOU FELT THAT YOU OR YOUR FAMILY WOULD BE BETTER OFF IF YOU WERE DEAD?: YES

## 2025-07-31 SDOH — HEALTH STABILITY: MENTAL HEALTH: BEHAVIORS/MOOD: CALM;COOPERATIVE;FLAT AFFECT

## 2025-07-31 SDOH — HEALTH STABILITY: MENTAL HEALTH
DEPRESSION SYMPTOMS: LOSS OF INTEREST;ISOLATIVE;INCREASED IRRITABILITY;FEELINGS OF HOPELESSESS;FEELINGS OF HELPLESSNESS;APPETITE CHANGE;SLEEP DISTURBANCE

## 2025-07-31 ASSESSMENT — ACTIVITIES OF DAILY LIVING (ADL)
TOILETING: INDEPENDENT
GROOMING: INDEPENDENT
BATHING: INDEPENDENT
LACK_OF_TRANSPORTATION: NO
HEARING - LEFT EAR: FUNCTIONAL
HEARING - RIGHT EAR: FUNCTIONAL
WALKS IN HOME: INDEPENDENT
PATIENT'S MEMORY ADEQUATE TO SAFELY COMPLETE DAILY ACTIVITIES?: YES
ADEQUATE_TO_COMPLETE_ADL: YES
FEEDING YOURSELF: INDEPENDENT
DRESSING YOURSELF: INDEPENDENT
JUDGMENT_ADEQUATE_SAFELY_COMPLETE_DAILY_ACTIVITIES: YES

## 2025-07-31 ASSESSMENT — LIFESTYLE VARIABLES
TOTAL SCORE: 0
AUDIT-C TOTAL SCORE: 0
PRESCIPTION_ABUSE_PAST_12_MONTHS: NO
SKIP TO QUESTIONS 9-10: 1
HOW OFTEN DO YOU HAVE 6 OR MORE DRINKS ON ONE OCCASION: NEVER
HOW MANY STANDARD DRINKS CONTAINING ALCOHOL DO YOU HAVE ON A TYPICAL DAY: PATIENT DOES NOT DRINK
ORIENTATION AND CLOUDING OF SENSORIUM: ORIENTED AND CAN DO SERIAL ADDITIONS
EVER HAD A DRINK FIRST THING IN THE MORNING TO STEADY YOUR NERVES TO GET RID OF A HANGOVER: NO
AUDIT-C TOTAL SCORE: 0
NAUSEA AND VOMITING: NO NAUSEA AND NO VOMITING
HAVE PEOPLE ANNOYED YOU BY CRITICIZING YOUR DRINKING: NO
AUDITORY DISTURBANCES: NOT PRESENT
BLOOD PRESSURE: 117/80
SUBSTANCE_ABUSE_PAST_12_MONTHS: NO
VISUAL DISTURBANCES: NOT PRESENT
EVER FELT BAD OR GUILTY ABOUT YOUR DRINKING: NO
SUBSTANCE_ABUSE_PAST_12_MONTHS: YES
AGITATION: NORMAL ACTIVITY
AUDIT-C TOTAL SCORE: 0
ANXIETY: MODERATELY ANXIOUS, OR GUARDED, SO ANXIETY IS INFERRED
PULSE: 145
TOTAL_SCORE: 4
HOW OFTEN DO YOU HAVE A DRINK CONTAINING ALCOHOL: NEVER
HAVE YOU EVER FELT YOU SHOULD CUT DOWN ON YOUR DRINKING: NO
CIWA TOTAL SCORE: 4
PRESCIPTION_ABUSE_PAST_12_MONTHS: NO
TREMOR: NO TREMOR
SKIP TO QUESTIONS 9-10: 1
PAROXYSMAL SWEATS: NO SWEAT VISIBLE
HEADACHE, FULLNESS IN HEAD: NOT PRESENT

## 2025-07-31 ASSESSMENT — PAIN SCALES - GENERAL
PAINLEVEL_OUTOF10: 0 - NO PAIN
PAINLEVEL_OUTOF10: 0 - NO PAIN

## 2025-07-31 ASSESSMENT — PATIENT HEALTH QUESTIONNAIRE - PHQ9
1. LITTLE INTEREST OR PLEASURE IN DOING THINGS: MORE THAN HALF THE DAYS
SUM OF ALL RESPONSES TO PHQ9 QUESTIONS 1 & 2: 4
2. FEELING DOWN, DEPRESSED OR HOPELESS: MORE THAN HALF THE DAYS

## 2025-07-31 ASSESSMENT — PAIN - FUNCTIONAL ASSESSMENT
PAIN_FUNCTIONAL_ASSESSMENT: 0-10
PAIN_FUNCTIONAL_ASSESSMENT: 0-10

## 2025-07-31 NOTE — SIGNIFICANT EVENT
Application for Emergency Admission      Ready for Transfer?  Is the patient medically cleared for transfer to inpatient psychiatry: Yes  Has the patient been accepted to an inpatient psychiatric hospital: Yes    Application for Emergency Admission  IN ACCORDANCE WITH [place] 5122[place]10 [place]  The Chief Clinical Officer of: NANCY Ashford 7/31/2025 .2:31 PM    Reason for Hospitalization  The undersigned has reason to believe that: Becca Joshi Is a mentally ill person subject to hospitalization by court order under division B Section 5122.01 of the Revised Code, i.e., this person:    1.Yes  Represents a substantial risk of physical harm to self as manifested by evidence of threats of, or attempts at, suicide or serious self-inflicted bodily harm    2.No Represents a substantial risk of physical harm to others as manifested by evidence of recent homicidal or other violent behavior, evidence of recent threats that place another in reasonable fear of violent behavior and serious physical harm, or other evidence of present dangerousness    3.No Represents a substantial and immediate risk of serious physical impairment or injury to self as manifested by  evidence that the person is unable to provide for and is not providing for the person's basic physical needs because of the person's mental illness and that appropriate provision for those needs cannot be made  immediately available in the community    4.Yes Would benefit from treatment in a hospital for his mental illness and is in need of such treatment as manifested by evidence of behavior that creates a grave and imminent risk to substantial rights of others or  himself.    5.No Would benefit from treatment as manifested by evidence of behavior that indicates all of the following:       (a) The person is unlikely to survive safely in the community without supervision, based on a clinical determination.       (b) The person has a history of lack of compliance with  treatment for mental illness and one of the following applies:      (i) At least twice within the thirty-six months prior to the filing of an affidavit seeking court-ordered treatment of the person under section 5122.111 of the Revised Code, the lack of compliance has been a significant factor in necessitating hospitalization in a hospital or receipt of services in a forensic or other mental health unit of a correctional facility, provided that the thirty-six-month period shall be extended by the length of any hospitalization or incarceration of the person that occurred within the thirty-six-month period.      (ii) Within the forty-eight months prior to the filing of an affidavit seeking court-ordered treatment of the person under section 5122.111 of the Revised Code, the lack of compliance resulted in one or more acts of serious violent behavior toward self or others or threats of, or attempts at, serious physical harm to self or others, provided that the forty-eight-month period shall be extended by the length of any hospitalization or incarceration of the person that occurred within the forty-eight-month period.      (c) The person, as a result of mental illness, is unlikely to voluntarily participate in necessary treatment.       (d) In view of the person's treatment history and current behavior, the person is in need of treatment in order to prevent a relapse or deterioration that would be likely to result in substantial risk of serious harm to the person or others.    (e) Represents a substantial risk of physical harm to self or others if allowed to remain at liberty pending examination.    Therefore, it is requested that said person be admitted to the above named facility.    STATEMENT OF BELIEF    Must be filled out by one of the following: a psychiatrist, licensed physician, licensed clinical psychologist, health or ,  or .  (Statement shall include the circumstances under  which the individual was taken into custody and the reason for the person's belief that hospitalization is necessary. The statement shall also include a reference to efforts made to secure the individual's property at his residence if he was taken into custody there. Every reasonable and appropriate effort should be made to take this person into custody in the least conspicuous manner possible.)    Patient presented to the ED for suicidal ideation.  Patient states that she feels like she wants to die and that she has been very depressed lately.  None of her outpatient medications at home are working.    Sakina Craig MD 7/31/2025     _____________________________________________________________   Place of Employment: [employer]    STATEMENT OF OBSERVATION BY PSYCHIATRIST, LICENSED PHYSICIAN, OR LICENSED CLINICAL PSYCHOLOGIST, IF APPLICABLE    Place of Observation (e.g., [facility], [facility] mental [facility] center, general hospital, office, emergency facility)  (If applicable, please complete)    Sakina Craig MD 7/31/2025    _____________________________________________________________
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
## 2025-07-31 NOTE — PROGRESS NOTES
Emergency Medicine Transition of Care Note.    I received Becca Joshi in signout from Dr. Martinez.  Please see the previous ED provider note for all HPI, PE and MDM up to the time of signout at 0700. This is in addition to the primary record.    In brief Becca Joshi is an 39 y.o. female presenting for No chief complaint on file.    At the time of signout we were awaiting: EPAT evaluation    ED Course as of 07/31/25 1434   Thu Jul 31, 2025   0204 EKG interpreted by myself reveals normal sinus rhythm at a rate of 75 bpm with no ST elevation, there is T wave inversion however in lead V1, there is no previous EKG for comparison, MN interval 146, QRS of 84 and QTc of 495. [EC]      ED Course User Index  [EC] ELIUD Vargas-CNP         Diagnoses as of 07/31/25 1434   Suicidal ideation   Depression, unspecified depression type       Medical Decision Making  Patient presents to the ED for depression and suicidal ideation.  She was initially seen and managed by the previous provider.  Patient has been medically cleared for EPAT evaluation.  She was signed out to myself pending evaluation by EPAT and further recommendations.    The patient was evaluated by EPAT, and is recommending inpatient psychiatric placement.  Patient was accepted for admission to [facility].  Patient will be transferred to [facility] for further inpatient psychiatric management under the care of Dr. Venegas.      Final diagnoses:   [R45.851] Suicidal ideation   [F32.A] Depression, unspecified depression type       Procedure  Procedures    Sakina Craig MD
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
## 2025-07-31 NOTE — ED PROVIDER NOTES
HPI   No chief complaint on file.      Patient is a 39-year-old female with past medical history of bipolar disorder, angina pectoris, anxiety, depression, cervalgia, paresthesia in right hand, previous suicide attempt, ADHD, anal fissure, IUD, seasonal allergies, presents the emergency room today for suicidal ideation.  Patient states she lives in constant pain due to unknown underlying condition.  Patient denies taking any medication at home to help with her pain.  Patient states she has been stressed lately and states they lock was broke at work and she has to tell clients about this.  Patient states she feels like she wants to die and states she has been very depressed lately.  Patient also states that she does not want to be alive as she was first born to her parents in an arranged marriage and this upsets her.  Patient denies any chest pain, shortness of breath difficulty breathing, abdominal pain, nausea vomiting or diarrhea or constipation, fever, shaking, chills, drug or alcohol use.              Patient History   Medical History[1]  Surgical History[2]  Family History[3]  Social History[4]    Physical Exam   ED Triage Vitals [07/31/25 0039]   Temperature Heart Rate Respirations BP   36.4 °C (97.5 °F) (!) 122 (!) 22 (!) 150/93      Pulse Ox Temp Source Heart Rate Source Patient Position   100 % Temporal -- Sitting      BP Location FiO2 (%)     Left arm --       Physical Exam  Vitals and nursing note reviewed.   Constitutional:       General: She is not in acute distress.     Appearance: Normal appearance. She is well-developed and normal weight. She is not ill-appearing, toxic-appearing or diaphoretic.   HENT:      Head: Normocephalic and atraumatic.      Nose: No congestion or rhinorrhea.      Mouth/Throat:      Mouth: Mucous membranes are moist.      Pharynx: No oropharyngeal exudate or posterior oropharyngeal erythema.     Eyes:      General: No visual field deficit or scleral icterus.        Right eye:  No discharge.         Left eye: No discharge.      Extraocular Movements: Extraocular movements intact.      Right eye: Normal extraocular motion and no nystagmus.      Left eye: Normal extraocular motion and no nystagmus.      Conjunctiva/sclera: Conjunctivae normal.      Pupils: Pupils are equal, round, and reactive to light. Pupils are equal.      Right eye: Pupil is round and reactive.      Left eye: Pupil is round and reactive.     Neck:      Vascular: No carotid bruit.     Cardiovascular:      Rate and Rhythm: Normal rate and regular rhythm.      Pulses: Normal pulses.      Heart sounds: Normal heart sounds. No murmur heard.     No friction rub. No gallop.   Pulmonary:      Effort: Pulmonary effort is normal. No respiratory distress.      Breath sounds: Normal breath sounds. No stridor. No wheezing, rhonchi or rales.   Chest:      Chest wall: No tenderness.     Musculoskeletal:         General: No swelling. Normal range of motion.      Cervical back: Normal range of motion and neck supple. No rigidity or tenderness.   Lymphadenopathy:      Cervical: No cervical adenopathy.     Skin:     General: Skin is warm and dry.      Capillary Refill: Capillary refill takes less than 2 seconds.     Neurological:      General: No focal deficit present.      Mental Status: She is alert and oriented to person, place, and time.      GCS: GCS eye subscore is 4. GCS verbal subscore is 5. GCS motor subscore is 6.      Cranial Nerves: No cranial nerve deficit, dysarthria or facial asymmetry.      Sensory: No sensory deficit.      Motor: No weakness.      Coordination: Romberg sign negative. Coordination normal.      Gait: Gait normal.      Deep Tendon Reflexes: Reflexes normal.           ED Course & [facility]   ED Course as of 07/31/25 0246   Thu Jul 31, 2025   0204 EKG interpreted by myself reveals normal sinus rhythm at a rate of 75 bpm with no ST elevation, there is T wave inversion however in lead V1, there is no previous EKG for  comparison, IN interval 146, QRS of 84 and QTc of 495. [EC]      ED Course User Index  [EC] KARLA Vargas         Diagnoses as of 07/31/25 0246   Suicidal ideation   Depression, unspecified depression type                 No data recorded                                 Medical Decision Making  Given patient's complaint presentation a thorough exam was performed.  Patient has been tearful during initial evaluation however has been fairly cooperative.  Patient refusing to change of gown initially as she states she does not like the material.  Patient does admit to being depressed and saying she does not want to live anymore due to the chronic pain.  Patient denies a plan.  Patient denies any homicidal ideation, visual auditory hallucination.  Patient denies any alcohol or drug use.  No adventitious lung sounds auscultated, speaking clearly no distress, cardiac sounds auscultated are regular.  Plan will be for patient to be medically cleared and evaluated by EPAT services.    KARLA Vargas     Portions of this note were generated using digital voice recognition software, and may contain grammatical errors      Procedure  Procedures       [1] No past medical history on file.  [2] No past surgical history on file.  [3]   Family History  Problem Relation Name Age of Onset    Ovarian cancer Maternal Grandmother     [4]   Social History  Tobacco Use    Smoking status: Not on file    Smokeless tobacco: Not on file   Substance Use Topics    Alcohol use: Not on file    Drug use: Not on file        KARLA Vargas  07/31/25 0246

## 2025-08-01 PROBLEM — F90.9 ADHD (ATTENTION DEFICIT HYPERACTIVITY DISORDER): Chronic | Status: ACTIVE | Noted: 2025-08-01

## 2025-08-01 PROBLEM — F34.1 PERSISTENT DEPRESSIVE DISORDER: Status: ACTIVE | Noted: 2023-09-17

## 2025-08-01 PROBLEM — G47.30 SLEEP APNEA: Chronic | Status: ACTIVE | Noted: 2025-08-01

## 2025-08-01 PROBLEM — G47.00 INSOMNIA: Chronic | Status: ACTIVE | Noted: 2025-08-01

## 2025-08-01 LAB
ATRIAL RATE: 74 BPM
HOLD SPECIMEN: NORMAL
P AXIS: 68 DEGREES
PR INTERVAL: 146 MS
Q ONSET: 253 MS
QRS COUNT: 12 BEATS
QRS DURATION: 84 MS
QT INTERVAL: 443 MS
QTC CALCULATION(BAZETT): 495 MS
QTC FREDERICIA: 476 MS
R AXIS: 34 DEGREES
T AXIS: 27 DEGREES
T OFFSET: 475 MS
TSH SERPL-ACNC: 2.54 MIU/L (ref 0.44–3.98)
VENTRICULAR RATE: 75 BPM

## 2025-08-01 PROCEDURE — 2500000001 HC RX 250 WO HCPCS SELF ADMINISTERED DRUGS (ALT 637 FOR MEDICARE OP): Performed by: PSYCHIATRY & NEUROLOGY

## 2025-08-01 PROCEDURE — 2500000002 HC RX 250 W HCPCS SELF ADMINISTERED DRUGS (ALT 637 FOR MEDICARE OP, ALT 636 FOR OP/ED): Performed by: PSYCHIATRY & NEUROLOGY

## 2025-08-01 PROCEDURE — 2500000001 HC RX 250 WO HCPCS SELF ADMINISTERED DRUGS (ALT 637 FOR MEDICARE OP)

## 2025-08-01 PROCEDURE — 97165 OT EVAL LOW COMPLEX 30 MIN: CPT | Mod: GO | Performed by: OCCUPATIONAL THERAPIST

## 2025-08-01 PROCEDURE — 99223 1ST HOSP IP/OBS HIGH 75: CPT | Performed by: PSYCHIATRY & NEUROLOGY

## 2025-08-01 PROCEDURE — 1240000001 HC SEMI-PRIVATE BH ROOM DAILY

## 2025-08-01 PROCEDURE — 2500000001 HC RX 250 WO HCPCS SELF ADMINISTERED DRUGS (ALT 637 FOR MEDICARE OP): Performed by: INTERNAL MEDICINE

## 2025-08-01 RX ORDER — MIRTAZAPINE 15 MG/1
7.5 TABLET, FILM COATED ORAL NIGHTLY PRN
Status: DISCONTINUED | OUTPATIENT
Start: 2025-08-01 | End: 2025-08-02

## 2025-08-01 RX ORDER — FLUTICASONE PROPIONATE 50 MCG
2 SPRAY, SUSPENSION (ML) NASAL EVERY 12 HOURS PRN
Status: DISCONTINUED | OUTPATIENT
Start: 2025-08-01 | End: 2025-08-04 | Stop reason: HOSPADM

## 2025-08-01 RX ORDER — DEXTROAMPHETAMINE SACCHARATE, AMPHETAMINE ASPARTATE, DEXTROAMPHETAMINE SULFATE AND AMPHETAMINE SULFATE 2.5; 2.5; 2.5; 2.5 MG/1; MG/1; MG/1; MG/1
10 TABLET ORAL 2 TIMES DAILY PRN
Refills: 0 | Status: DISCONTINUED | OUTPATIENT
Start: 2025-08-01 | End: 2025-08-04 | Stop reason: HOSPADM

## 2025-08-01 RX ORDER — ASCORBIC ACID 500 MG
500 TABLET ORAL DAILY
Status: DISCONTINUED | OUTPATIENT
Start: 2025-08-01 | End: 2025-08-04 | Stop reason: HOSPADM

## 2025-08-01 RX ORDER — CETIRIZINE HYDROCHLORIDE 10 MG/1
10 TABLET ORAL DAILY
Status: DISCONTINUED | OUTPATIENT
Start: 2025-08-01 | End: 2025-08-04 | Stop reason: HOSPADM

## 2025-08-01 RX ORDER — KETOTIFEN FUMARATE 0.35 MG/ML
1 SOLUTION/ DROPS OPHTHALMIC 2 TIMES DAILY
Status: DISCONTINUED | OUTPATIENT
Start: 2025-08-01 | End: 2025-08-04 | Stop reason: HOSPADM

## 2025-08-01 RX ORDER — OXYBUTYNIN CHLORIDE 5 MG/1
2.5 TABLET ORAL 2 TIMES DAILY
Status: DISCONTINUED | OUTPATIENT
Start: 2025-08-01 | End: 2025-08-04 | Stop reason: HOSPADM

## 2025-08-01 RX ORDER — MIRTAZAPINE 15 MG/1
7.5 TABLET, FILM COATED ORAL NIGHTLY
Status: DISCONTINUED | OUTPATIENT
Start: 2025-08-01 | End: 2025-08-01

## 2025-08-01 RX ORDER — HYDROXYZINE HYDROCHLORIDE 25 MG/1
50 TABLET, FILM COATED ORAL 3 TIMES DAILY
Status: DISCONTINUED | OUTPATIENT
Start: 2025-08-01 | End: 2025-08-04 | Stop reason: HOSPADM

## 2025-08-01 RX ORDER — FLUOXETINE 20 MG/1
20 CAPSULE ORAL
Status: DISCONTINUED | OUTPATIENT
Start: 2025-08-01 | End: 2025-08-04 | Stop reason: HOSPADM

## 2025-08-01 RX ADMIN — FLUOXETINE HYDROCHLORIDE 20 MG: 20 CAPSULE ORAL at 14:17

## 2025-08-01 RX ADMIN — CETIRIZINE HYDROCHLORIDE 10 MG: 10 TABLET, FILM COATED ORAL at 16:36

## 2025-08-01 RX ADMIN — HYDROXYZINE HYDROCHLORIDE 50 MG: 25 TABLET, FILM COATED ORAL at 14:17

## 2025-08-01 RX ADMIN — OXYCODONE HYDROCHLORIDE AND ACETAMINOPHEN 500 MG: 500 TABLET ORAL at 16:36

## 2025-08-01 RX ADMIN — CEFUROXIME AXETIL 250 MG: 250 TABLET ORAL at 20:39

## 2025-08-01 RX ADMIN — HYDROXYZINE HYDROCHLORIDE 50 MG: 25 TABLET, FILM COATED ORAL at 20:39

## 2025-08-01 RX ADMIN — OXYBUTYNIN CHLORIDE 2.5 MG: 5 TABLET ORAL at 20:38

## 2025-08-01 RX ADMIN — CEFUROXIME AXETIL 250 MG: 250 TABLET ORAL at 09:26

## 2025-08-01 SDOH — SOCIAL STABILITY: SOCIAL INSECURITY: DOES ANYONE TRY TO KEEP YOU FROM HAVING/CONTACTING OTHER FRIENDS OR DOING THINGS OUTSIDE YOUR HOME?: NO

## 2025-08-01 SDOH — SOCIAL STABILITY: SOCIAL INSECURITY: DO YOU FEEL UNSAFE GOING BACK TO THE PLACE WHERE YOU ARE LIVING?: NO

## 2025-08-01 SDOH — SOCIAL STABILITY: SOCIAL INSECURITY: ARE YOU OR HAVE YOU BEEN THREATENED OR ABUSED PHYSICALLY, EMOTIONALLY, OR SEXUALLY BY ANYONE?: YES

## 2025-08-01 SDOH — SOCIAL STABILITY: SOCIAL INSECURITY: HAS ANYONE EVER THREATENED TO HURT YOUR FAMILY OR YOUR PETS?: NO

## 2025-08-01 SDOH — SOCIAL STABILITY: SOCIAL INSECURITY: ARE THERE ANY APPARENT SIGNS OF INJURIES/BEHAVIORS THAT COULD BE RELATED TO ABUSE/NEGLECT?: NO

## 2025-08-01 SDOH — SOCIAL STABILITY: SOCIAL INSECURITY: DO YOU FEEL ANYONE HAS EXPLOITED OR TAKEN ADVANTAGE OF YOU FINANCIALLY OR OF YOUR PERSONAL PROPERTY?: NO

## 2025-08-01 SDOH — SOCIAL STABILITY: SOCIAL INSECURITY: HAVE YOU HAD ANY THOUGHTS OF HARMING ANYONE ELSE?: NO

## 2025-08-01 SDOH — SOCIAL STABILITY: SOCIAL INSECURITY: HAVE YOU HAD THOUGHTS OF HARMING ANYONE ELSE?: NO

## 2025-08-01 SDOH — SOCIAL STABILITY: SOCIAL INSECURITY: WERE YOU ABLE TO COMPLETE ALL THE BEHAVIORAL HEALTH SCREENINGS?: YES

## 2025-08-01 SDOH — HEALTH STABILITY: MENTAL HEALTH: EXPERIENCED ANY OF THE FOLLOWING LIFE EVENTS: CHILDHOOD NEGLECT;SOCIAL LOSS (BANKRUPTCY, DIVORCE, WORK-RELATED STRESS)

## 2025-08-01 SDOH — SOCIAL STABILITY: SOCIAL INSECURITY: ABUSE: ADULT

## 2025-08-01 ASSESSMENT — ENCOUNTER SYMPTOMS
SLEEP DISTURBANCE: 1
NERVOUS/ANXIOUS: 1
DYSPHORIC MOOD: 1

## 2025-08-01 ASSESSMENT — PAIN SCALES - GENERAL
PAINLEVEL_OUTOF10: 6
PAINLEVEL_OUTOF10: 6

## 2025-08-01 ASSESSMENT — PATIENT HEALTH QUESTIONNAIRE - PHQ9
SUM OF ALL RESPONSES TO PHQ9 QUESTIONS 1 & 2: 6
1. LITTLE INTEREST OR PLEASURE IN DOING THINGS: NEARLY EVERY DAY
2. FEELING DOWN, DEPRESSED OR HOPELESS: NEARLY EVERY DAY

## 2025-08-01 ASSESSMENT — PAIN - FUNCTIONAL ASSESSMENT
PAIN_FUNCTIONAL_ASSESSMENT: 0-10
PAIN_FUNCTIONAL_ASSESSMENT: 0-10

## 2025-08-01 ASSESSMENT — ACTIVITIES OF DAILY LIVING (ADL): LACK_OF_TRANSPORTATION: NO

## 2025-08-01 ASSESSMENT — LIFESTYLE VARIABLES
HOW OFTEN DO YOU HAVE 6 OR MORE DRINKS ON ONE OCCASION: NEVER
AUDIT-C TOTAL SCORE: 0
HOW MANY STANDARD DRINKS CONTAINING ALCOHOL DO YOU HAVE ON A TYPICAL DAY: PATIENT DOES NOT DRINK
SUBSTANCE_ABUSE_PAST_12_MONTHS: NO
HOW OFTEN DO YOU HAVE A DRINK CONTAINING ALCOHOL: NEVER
SKIP TO QUESTIONS 9-10: 1
PRESCIPTION_ABUSE_PAST_12_MONTHS: NO
AUDIT-C TOTAL SCORE: 0

## 2025-08-01 NOTE — CARE PLAN
Problem: Discharge Planning  Goal: Discharge to home or other facility with appropriate resources  Outcome: Progressing     Problem: Discharge Planning - Care Management  Goal: Discharge to post-acute care or home with appropriate resources  Outcome: Progressing     Problem: Community resource needs  Goal: Patient is receiving increased resource support to enhance ability to remain at home  Outcome: Progressing     Problem: Mental health issues  Goal: Stabilize adverse mental health factors affecting plan of care  Outcome: Progressing

## 2025-08-01 NOTE — NURSING NOTE
Pt completed the admission paper work  Dr Jung saw her and orders put in  Pt then walked from the round table to her room where she went to bed Pt slept all night long  Pt did not require any prn medications to help her sleep  Pt had an uneventful night  Continue to monitor Q 15 minute room checks

## 2025-08-01 NOTE — ASSESSMENT & PLAN NOTE
On adderall xr 20 mg daily with some benefit  Plan:  Continue home meds:  Substitute adderall 10 mg IR BID

## 2025-08-01 NOTE — ASSESSMENT & PLAN NOTE
Likely multifactorial 2/2 sleep apnea, depression, pain.  Plan:  - Start mirtazapine 7.5 mg nightly PRN insomnia (pt was prescribed this outpt but had not started it yet)

## 2025-08-01 NOTE — SIGNIFICANT EVENT
08/01/25 1646   Discharge Planning   Living Arrangements Alone   Support Systems Spouse/significant other;Other (Comment)  ([facility] , [place] location)   Type of Residence Private residence   Do you have animals or pets at home? Yes   Type of Animals or Pets 2 cats   Who is requesting discharge planning? Provider   Home or Post Acute Services Community services   Expected Discharge Disposition Home   Does the patient need discharge transport arranged? Yes   Financial Resource Strain   How hard is it for you to pay for the very basics like food, housing, medical care, and heating? Not hard   Housing Stability   In the last 12 months, was there a time when you were not able to pay the mortgage or rent on time? N   At any time in the past 12 months, were you homeless or living in a shelter (including now)? N   Transportation Needs   In the past 12 months, has lack of transportation kept you from medical appointments or from getting medications? no   In the past 12 months, has lack of transportation kept you from meetings, work, or from getting things needed for daily living? No   Stroke Family Assessment   Stroke Family Assessment Needed No   Intensity of Service   Intensity of Service >30 min       Patient is a 39 year old female with recorded history of bipolar disorder, ADHD, CPSD, anxiety, and depression. Patient self-reported history of borderline personality disorder, ODD, and autism spectrum disorder. Patient presented to ED with complaint of psychiatric evaluation. She indicates that [facility] without [facility]. 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   Patient resides alone, has support of [facility] and is active with mental health services thru [facility]. She  is currently and IOP program patient.   Patient drives, currently no report financial issues.  She denies any self harming issues for approx 10 years.     Initial Goals:   Stabilize: medication per provider  OT consult   Group and milieu therapy  Coordination of care with community mental health provider for a safe an appropriate disposition plan. SW did contact [facility] [place] [facility] to confirm existing appointments.   Pharmacy : [facility] .

## 2025-08-01 NOTE — PROGRESS NOTES
Occupational Therapy     REHAB Therapy Assessment & Treatment    Patient Name: Becca Joshi  MRN: [id number]  Today's Date: 8/1/2025      Activity Assessment:  Reflecting on stress- [id number]  Practicing gratitude- 0815-1088  Leisure skills/socialization- [id number]     0/3 groups attended     Pt did not participate in any groups this date for unknown reasons. Pt would benefit from continuation of OT services in order to improve overall self-esteem, personal confidence and positive supports for safe transition at discharge.        Encounter Problems       Encounter Problems (Active)       OT Goals       Pt will explore and ID 1-2 strategies to manage stressors/symptoms of illness/ grief more effectively prior to discharge.         Start:  08/01/25    Expected End:  08/15/25            Pt will ID 2-3 STG and 1-2 LTG, including methods to achieve goals after discharge        Start:  08/01/25    Expected End:  08/15/25            Pt will ID 2 community resources/programs to join/attend after D/C to improve their support system        Start:  08/01/25    Expected End:  08/15/25            Pt will demo/ID ways to improve effectiveness in completing tasks and responsibilities, while focusing attention on the present.        Start:  08/01/25    Expected End:  08/15/25            Pt will demonstrate improved self-esteem and awareness by independently identifying 2-3 personal strengths and 2-3 difficulties faced in daily living.          Start:  08/01/25    Expected End:  08/15/25                     Education Documentation  No documentation found.  Education Comments  No comments found.          Additional Comments:

## 2025-08-01 NOTE — PROGRESS NOTES
" REHAB Therapy Assessment & Treatment    Patient Name: Becca Joshi  MRN: [id number]  Today's Date: 8/1/2025      Activity Assessment:  Initial Assessment  Attention Span: 15-30 Miutes  Cognitive Behavior Status/Orientation: Person, Place, Time, Attentive  Crisis Triggers: Emotions, Family/friends, Mood (worsening depression, \"brain is fried\", within last year - break up with significant other and cat passed away)  Emotional Concerns/Mood/Affect: Cooperative, Depressed, Disinterested, Flat/blunted, Withdrawn  Hearing: Adequate  Memory: Memory intact  Motivation Level: Maximum encouragement needed  Speech/Communication/Socialization: Verbal  Vision: Adequate    Leisure Survey:  [facility]  Activity Preference: Independent  Activity Tolerance: Poor 0-15 minutes  At Home ADL Deficits:  (pt is independent, but apparently has aversion to water and uses wipes to get cleaned up, struggles with sleep due to pain/sleep apnea)  Barriers to Leisure Participation: Emotions, Mood/affect, Lack of motivation, No one to participate with  Community Resources: Active in community groups  Education/School: Bachelor's Degree  Following Directions: Able to follow multi-step commands  Leisure Interests: Needs to expand leisure interests  Living Arrangement: Alone  Motivators for Recreation/Leisure Involvement: Fun/entertainment, Sense of well being/contentment, Self-esteem/sense of accomplishment  Outdoor Activities:  (used to enjoy dena diving)  Patient/Family Education Needs: healthy coping skills  Patient Strengths: taking care of her cats  Patient Weakness: asking for help  Social/Group Activities:  (keeps to herself, lack of support/friends)  Solitary Activities: Watch/listen television, Music  Transportation: Drives  Work/Volunteer: owns business - doing taxes  Additional Comments: Pt is a 39 year old F with a history of Bipolar Disorder, anxiety, depression, and ADHD, admitted due to worsening " "depression and suicidal ideation. Pt reports feeling like her \"brain is fried\" and expresses frustration over medications not working. Pt met with after OT assessment, therefore conversation was kept brief. Pt appears to have a structured routine of daily responsibilites, leisure, groups, and work, but is struggling emotionally. She expressed feeling hopeless and does not feel she will get better. Pt has been withdrawn to her room and does not appear to be interested in groups, but will be encouraged to attend daily.       Therapeutic Recreation:         Encounter Problems       Encounter Problems (Active)       Emotional  RT       Mood       Start:  08/01/25    Expected End:  08/08/25               Recreation  RT       Awareness       Start:  08/01/25    Expected End:  08/08/25               Social       Stimulation       Start:  08/01/25    Expected End:  08/08/25                     Education Documentation  No documentation found.  Education Comments  No comments found.                    "

## 2025-08-01 NOTE — GROUP NOTE
Group Topic: Art Creative   Group Date: 8/1/2025  Start Time: 1400  End Time: 1500  Facilitators: CLAYTON Pham   Department: [facility] REHAB THERAPY VIRTUAL    Number of Participants: 7   Group Focus: leisure skills and social skills  Treatment Modality: Recreational Therapy   Interventions utilized were Suncatchers, Window Clings, Coloring, Music, exploration and leisure development  Purpose: other: creative expression, leisure awareness, social engagement     Name: Becca Joshi YOB: 1985   MR: [id number]      Facilitator: Recreational Therapist  Level of Participation: did not attend  Progress: None  Comments: Patients were gathered to participate in a variety of creative tasks (Suncatchers, Window Clings, Velvet Coloring). This activity works on creative expression, fine motor skills, following directions, positive social interaction, and leisure awareness.    Patient declined invitation to group activity at this time. Patient will continue to be provided with opportunities to enhance leisure skills and/or coping mechanisms.    Plan: continue with services

## 2025-08-01 NOTE — PROGRESS NOTES
"Occupational Therapy     REHAB Therapy Assessment     Patient Name: Becca Joshi  MRN: [id number]  Today's Date: 8/1/2025      Activity Assessment:  Time In:8:57 Time Out:9:16  Date of OT Referral:8/1/25   Admission Diagnosis:SI   Reason for Referral:ADL and safety assessment  General Information   Past Medical History/History of Present Illness:   Pain: 6/10 Location:shoulder Interventions:repositioning   Precautions: restricted to unit, suicide   Appearance:pt in hospital grown, hospital pants, and socks. Messy bun. Sitting on bed with legs bent. No eye contact. 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   Personal History   Marital Status:single   Community Support:group therapy   Support System:pt stated her friend Bashir, however pt stated she doesn't like to burden him. 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   Psychosocial/Cognition Assessment   Orientation:A&O x 4   Attention: WFL   Follows Commands:WFL   Mood:pt was tearful and sad   Safety Awareness:fair   Patient Identified Life Roles: worker, pet owner   Patient Identified Goals-Short Term: \"to get out of here\"   Patient Identified Goals-Long Term: pt unable to identify  Perceptual/Communication Skills   Speech (dysarthric, exp/rec aphasia, pressured, etc.): WFL   Vision:WFL   Perception (inattention, delusions, hallucinations, suicidal, etc): pt continues to verbalize suicidal thoughts   Reality Based Behavior:WFL   Perseverations:pt stated \"my brain is fried\" throughout session   Social " "Skills/Behavior: pt noted she doesn't have my friends and she doesn't want to burden them. Pt was hesitant to agree to go to groups and would prefer to be alone in her room.   Basic Functional Mobility   Device Used:none   Bed Mobility:independent   Transfer Status:independent   Ambulatory Status: independent   Balance:WFL   Endurance:WFL  Activities of Daily Living   Grooming/Hygiene:independent   Dressing:independent   Bathing:Pt stated she has an aversion to water. Pt stated she washes up with baby wipes and washes her hair once a week.   Toileting:independent   Sleep:pt stated she wakes up every 1-3 hrs due to pain or sleep apnea. Pt noted that she sleeps whenever she is tired whether it is 4pm or 2am.   Instrumental Activities of Daily Living   Driving/ transportation:(+) license, (+) car   Medication Management/Compliance:pt stated she does not take pain medication but she takes her other medications regularly    Managing Finances: \"I pay my bills\"   Patient Reported Daily Routine/Responsibility: pt stated lately she wakes up, goes to group therapy, does stuff around the house, watches TV, works, and goes to sleep when she's tired.   Emotional/Mental Health Management   Patient Identified Coping Skills- \"sleep\"   Knowledge of Illness/Emotions: pt stated she is severally depressed and she has always been this way. Pt feels she is a guinea pig because she keeps trying different medications and feels they are not working.    Stress Management: poor. Pt stated her \"brain is fried\" and she doesn't want to answer emails or look at her phone because it's too much   Anger/Frustration Management: pt is angry with her family and her past.    Depression/Anxiety Management:8.5/10. Pt stated her depression is constant   Patient Identified Strengths:\"feeding my cats\"   Patient Identified Weaknesses:\"asking for help\"   Relapse Prevention Skills/Supports: pt goes to group therapy however pt noted that she feels she will never " "get better and things will just get worse.          Encounter Problems       Encounter Problems (Active)       OT Goals       Pt will explore and ID 1-2 strategies to manage stressors/symptoms of illness/ grief more effectively prior to discharge.         Start:  08/01/25    Expected End:  08/15/25            Pt will ID 2-3 STG and 1-2 LTG, including methods to achieve goals after discharge        Start:  08/01/25    Expected End:  08/15/25            Pt will ID 2 community resources/programs to join/attend after D/C to improve their support system        Start:  08/01/25    Expected End:  08/15/25            Pt will demo/ID ways to improve effectiveness in completing tasks and responsibilities, while focusing attention on the present.        Start:  08/01/25    Expected End:  08/15/25            Pt will demonstrate improved self-esteem and awareness by independently identifying 2-3 personal strengths and 2-3 difficulties faced in daily living.          Start:  08/01/25    Expected End:  08/15/25                     Education Documentation  No documentation found.  Education Comments  No comments found.          Additional Comments:  Pt cleared by RN. Pt seen in room with limited distractions. Upon arrival pt supine in bed. Pt sat up with complete evaluation, declining leaving the room. Pt demonstrated flat affect with limited eye contact and became tearful when expression feelings of depression and hopelessness. Pt stated her \"brain won't work\" \"I've broken my brain too many times\" \"my brain is fried\" throughout the session. Pt education on OT POC and ecouraged to attend 5x/week group sessions and/ or 1:1 sessions to address the goals established above prior to discharge.      "
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
## 2025-08-01 NOTE — CARE PLAN
"The patient's goals for the shift include \"go to group\"    The clinical goals for the shift include maintain safety    Over the shift, the patient did not make progress toward the following goals. Barriers to progression include acute illness. Recommendations to address these barriers include participation in plan of care  Problem: Infection - Adult  Goal: Absence of infection at discharge  Outcome: Progressing  Goal: Absence of infection during hospitalization  Outcome: Progressing  Goal: Absence of fever/infection during anticipated neutropenic period  Outcome: Progressing     Problem: Safety - Adult  Goal: Free from fall injury  Outcome: Progressing     Problem: Nutrition  Goal: Nutrient intake appropriate for maintaining nutritional needs  Outcome: Progressing   .    "

## 2025-08-01 NOTE — H&P
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 was reviewed with the patient at the time of admission and will be reiterated during inpatient stay and at the time of discharge. Referral will be made to return to medication management, therapist, case management as is indicated.  - Discharge to community once psychiatrically stable with outpatient follow up         Medication Consent,  risks, benefits, side effects reviewed for all ordered medications    Bradley Gómez MD  PGY-1, Internal Medicine    I saw and evaluated the patient. I personally obtained the key and critical portions of the history and physical exam or was physically present for key and critical portions performed by the resident/fellow. I reviewed the resident/fellow's documentation and discussed the patient with the resident/fellow. I agree with the resident/fellow's medical decision making as documented in the note.      Patient was seen and examined today. Time spent was in review of events and medications given overnight, both pending and completed lab/consults, medication prescribed (discussion of class, reason for recommendation, estimated onset of action, risk and benefits, group therapy notes, ot notes, vital signs, pcos (if appropriate), behaviors, nursing report, nursing assessments, plan of care, discharge plan.  I discussed patient's care overnight with  the patient's  nurse, this am.        Claudia Venegas MD              [1] cefuroxime, 250 mg, oral, BID  nicotine, 1 patch, transdermal, Daily   Followed by  [START ON 9/11/2025] nicotine, 1 patch, transdermal, Daily   Followed by  [START ON 9/25/2025] nicotine, 1 patch, transdermal, Daily  [2]    [3] PRN medications: alum-mag hydroxide-simeth, diphenhydrAMINE **OR** diphenhydrAMINE, haloperidol **OR** haloperidol lactate, haloperidol **OR** haloperidol lactate, hydrOXYzine HCL, ibuprofen, LORazepam **OR** midazolam, nicotine polacrilex, polyethylene glycol  [4] PRN medications: alum-mag hydroxide-simeth, diphenhydrAMINE **OR** diphenhydrAMINE, haloperidol **OR** haloperidol lactate, haloperidol **OR** haloperidol lactate, hydrOXYzine HCL, ibuprofen, LORazepam **OR** midazolam, nicotine polacrilex, polyethylene glycol  [5]   Allergies  Allergen Reactions    Clindamycin Nausea/vomiting    Codeine Nausea/vomiting

## 2025-08-01 NOTE — GROUP NOTE
"Group Topic: Spiritual/Devotional/Thought of the Day   Group Date: 8/1/2025  Start Time: 0730  End Time: 0800  Facilitators: CLAYTON Pham   Department: [facility] REHAB THERAP[facility] VIRTUAL    Number of Participants: 5   Group Focus: daily focus, goals, and personal responsibility  Treatment Modality: Recreational Therapy   Interventions utilized were Thought - \"The Little Things\", Relaxation Ball, exploration, story telling, and support  Purpose: coping skills, insight or knowledge, self-worth, and self-care    Name: Becca Joshi YOB: 1985   MR: [id number]      Facilitator: Recreational Therapist  Level of Participation: did not attend  Progress: None  Comments: Patients were provided with the Thought of the Day reading - “Taking care of the little things prevents larger problems.” Patients were given an opportunity to share their thoughts and reflect on a variety of prompts related to the reading.    Patient declined invitation to group activity at this time. Patient will continue to be provided with opportunities to enhance leisure skills and/or coping mechanisms.    Plan: continue with services      "

## 2025-08-01 NOTE — ASSESSMENT & PLAN NOTE
#Persistent depressive disorder with intermittent depressive episodes with current episode     Chronic history of depressed mood, thoughts of death, feelings of worthlessness, poor concentration since adolescence. No clear history of manic or psychotic sx elicited.     Pt endorses worsening sadness/depressed mood, decreased interest in activities, poor sleep, subjective weight loss, feelings of worthlessness/hopelessness over the last year, and low energy particularly over the last few months. She presents as dysphoric and with mild psychomotor slowing. Per documentation pt's friend has also noticed her making more frequent comments about wishing she were dead over the last few months.    Pt endorses passive death wish but denies suicidal ideation, intent, or plan.   Pt has gun at home but denies access to it.    Plan:  - Hospitalization for safety and supportive therapy  Continue home meds:  Fluoxetine 20 mg daily for depression   - Consider increasing fluoxetine given lack of response after 1 month on starting dose   - Check TSH

## 2025-08-01 NOTE — NURSING NOTE
"0926 Met with patient in her room, she is minimally engaged in assessment questions, she presents as irritable and annoyed by assessment. She endorses waking up every 1-3 hours every night, patient declines wanting to take anything at this time. Anxiety \"8.5\", depression \"I don't know how to rate that, its always there, constant.\" Denies SI/HI and AH/VH, she endorses pain 6/10 in neck, R. Shoulder \"its from a pinched nerve.\" When asked patient if she wants pain medication patient declines. She is med compliant. \"Oh yeah that's the antibiotic, they told me I have a UTI, isn't that just nice,\" patient stated in sarcastic tone.     1157 Patient went back to bed after talking with WALKER vasquez, patient crying in bed. This nurse attempted to address patient about emotional state, patient declined to talk to this nurse or needing anything.     1540 Patient does not attend groups or socialize on unit.      1600 Patient has only come out of room today to talk to WALKER vasquez and then Dr. Venegas and NENA. She has not eaten breakfast or lunch, patient has been encouraged to come out for meals or to come to table outside room for meals, patient declines, nutrition was consulted and assessed patient, patient endorses eating one meal a day but has gone without food for 3 days now. Staff will continue to encourage intake. 15 minute safety checks maintained.   "

## 2025-08-01 NOTE — DISCHARGE INSTR - APPOINTMENTS
[facility], [place]   [address] LISW [address]olly, LISW  [address], [place] [address] (P) [phone]  (F) [phone]  Appointment , In person: August 8, 2025 @ 1:15pm with JOHN Branch     Copeline: [phone]    Tobacco Cessation   [facility]   Call August 7th at 10am for tele appointment  [phone]

## 2025-08-01 NOTE — ASSESSMENT & PLAN NOTE
Pt used custom mouthguard in the past and plans to get a new one but has never tried CPAP.   Plan:  - Consider sleep medicine referral

## 2025-08-01 NOTE — CONSULTS
History Of Present Illness  Becca Joshi is a 39 y.o. female with a past medical history of bipolar disorder, ADHD, anxiety disorder, depression, suicide attempt in the past, CPTSD, fibromyalgia, carpal tunnel syndrome right wrist, pinched nerve at C6 and C7, torn left meniscus, chronic pain syndrome, tobacco use disorder and other comorbidities who was admitted to the behavioral health unit for suicidal ideation.  Medicine was consulted for medical management of chronic pain.  Patient was seen and examined in the behavioral health unit.  She states that she has chronic pain and the location mentioned above.  She states that she has not been following up with her pain management specialist but she still continue to do physical therapy at home by herself to help managing the pain.  She was supposed to do some more test for the pain management team.  She is not sure if they emailed her or called her because she has not been using her home phone or email because they give her anxiety.  She denies headache, change in vision, focal weakness, chest pain, nausea, vomiting abdominal pain, fever or chills.  Please note that patient also states that she has been walking a lot to fix mistakes done by her coworkers.     Past Medical History  As stated above in the HPI    Surgical History  She has no past surgical history on file.     Social History  Patient smokes 1 pack of cigarettes per day.  Denies EtOH abuse or illicit drug use.  She lives alone.  She has 2 cats.  Her work involves doing taxes.    Family History  Family History[1]     Allergies  Clindamycin and Codeine    Medications  Scheduled medications  Scheduled Medications[2]  Continuous medications  Continuous Medications[3]  PRN medications  PRN Medications[4]    Review of systems: 10-point review of systems is negative.  Patient does have an implant in the left arm for birth control prevention.  She thinks that it will need to be removed in 1 to 2 years.      Physical Exam  Constitutional: alert and oriented x 3, awake, cooperative, anxious and depressed  Skin: warm and dry  Head/Neck: Normocephalic, atraumatic  Eyes: clear sclera  ENMT: mucous membranes moist  Cardio: Regular rate and rhythm  Resp: CTA bilaterally, good respiratory effort  Gastrointestinal: Soft, nontender, nondistended, bowel sounds present  Musculoskeletal: ROM intact, no joint swelling  Extremities: No edema  Neuro: alert and oriented x 3, sensation is intact.  Patient moves all limbs against resistance.  Psychological: Anxious and depressed     Last Recorded Vitals  /80 (Patient Position: Standing)   Pulse (!) 145   Temp 36.4 °C (97.5 °F) (Temporal)   Wt 65.4 kg (144 lb 2.9 oz)   SpO2 96%     Relevant Results  Admission on 07/31/2025, Discharged on 07/31/2025   Component Date Value Ref Range Status    WBC 07/31/2025 11.2  4.4 - 11.3 x10*3/uL Final    nRBC 07/31/2025 0.0  0.0 - 0.0 /100 WBCs Final    RBC 07/31/2025 4.63  4.00 - 5.20 x10*6/uL Final    Hemoglobin 07/31/2025 14.2  12.0 - 16.0 g/dL Final    Hematocrit 07/31/2025 41.6  36.0 - 46.0 % Final    MCV 07/31/2025 90  80 - 100 fL Final    MCH 07/31/2025 30.7  26.0 - 34.0 pg Final    MCHC 07/31/2025 34.1  32.0 - 36.0 g/dL Final    RDW 07/31/2025 11.9  11.5 - 14.5 % Final    Platelets 07/31/2025 200  150 - 450 x10*3/uL Final    Neutrophils % 07/31/2025 71.4  40.0 - 80.0 % Final    Immature Granulocytes %, Automated 07/31/2025 0.5  0.0 - 0.9 % Final    Immature Granulocyte Count (IG) includes promyelocytes, myelocytes and metamyelocytes but does not include bands. Percent differential counts (%) should be interpreted in the context of the absolute cell counts (cells/UL).    Lymphocytes % 07/31/2025 19.6  13.0 - 44.0 % Final    Monocytes % 07/31/2025 7.1  2.0 - 10.0 % Final    Eosinophils % 07/31/2025 1.0  0.0 - 6.0 % Final    Basophils % 07/31/2025 0.4  0.0 - 2.0 % Final    Neutrophils Absolute 07/31/2025 7.98 (H)  1.20 - 7.70 x10*3/uL  Final    Percent differential counts (%) should be interpreted in the context of the absolute cell counts (cells/uL).    Immature Granulocytes Absolute, Au* 07/31/2025 0.06  0.00 - 0.70 x10*3/uL Final    Lymphocytes Absolute 07/31/2025 2.19  1.20 - 4.80 x10*3/uL Final    Monocytes Absolute 07/31/2025 0.79  0.10 - 1.00 x10*3/uL Final    Eosinophils Absolute 07/31/2025 0.11  0.00 - 0.70 x10*3/uL Final    Basophils Absolute 07/31/2025 0.04  0.00 - 0.10 x10*3/uL Final    Glucose 07/31/2025 107 (H)  74 - 99 mg/dL Final    Sodium 07/31/2025 139  136 - 145 mmol/L Final    Potassium 07/31/2025 3.6  3.5 - 5.3 mmol/L Final    Chloride 07/31/2025 106  98 - 107 mmol/L Final    Bicarbonate 07/31/2025 23  21 - 32 mmol/L Final    Anion Gap 07/31/2025 14  10 - 20 mmol/L Final    Urea Nitrogen 07/31/2025 9  6 - 23 mg/dL Final    Creatinine 07/31/2025 0.89  0.50 - 1.05 mg/dL Final    eGFR 07/31/2025 86  >60 mL/min/1.73m*2 Final    Calculations of estimated GFR are performed using the 2021 CKD-EPI Study Refit equation without the race variable for the IDMS-Traceable creatinine methods.  https://jasn.asnjournals.org/content/early/2021/09/22/ASN.8016051225    Calcium 07/31/2025 9.0  8.6 - 10.3 mg/dL Final    Albumin 07/31/2025 4.5  3.4 - 5.0 g/dL Final    Alkaline Phosphatase 07/31/2025 41  33 - 110 U/L Final    Total Protein 07/31/2025 6.8  6.4 - 8.2 g/dL Final    AST 07/31/2025 13  9 - 39 U/L Final    Bilirubin, Total 07/31/2025 0.7  0.0 - 1.2 mg/dL Final    ALT 07/31/2025 9  7 - 45 U/L Final    Patients treated with Sulfasalazine may generate falsely decreased results for ALT.    Amphetamine Screen, Urine 07/31/2025 Presumptive Positive (A)  Presumptive Negative Final    CUTOFF LEVEL: 500 NG/ML   Cross-reactivity has been reported with high concentrations   of the following drugs: buproprion, chloroquine, chlorpromazine,   ephedrine, mephentermine, fenfluramine, phentermine,   phenylpropanolamine, pseudoephedrine, and propranolol.     Barbiturate Screen, Urine 07/31/2025 Presumptive Negative  Presumptive Negative Final    CUTOFF LEVEL: 200 NG/ML    Benzodiazepines Screen, Urine 07/31/2025 Presumptive Negative  Presumptive Negative Final    CUTOFF LEVEL: 200 NG/ML    Cannabinoid Screen, Urine 07/31/2025 Presumptive Negative  Presumptive Negative Final    CUTOFF LEVEL: 50 NG/ML    Cocaine Metabolite Screen, Urine 07/31/2025 Presumptive Negative  Presumptive Negative Final    CUTOFF LEVEL: 150 NG/ML    Fentanyl Screen, Urine 07/31/2025 Presumptive Negative  Presumptive Negative Final    CUTOFF LEVEL: 5 NG/ML    Opiate Screen, Urine 07/31/2025 Presumptive Negative  Presumptive Negative Final    CUTOFF LEVEL: 300 NG/ML  The opiate screen does not detect fentanyl, meperidine, or   tramadol. Oxycodone is not consistently detected (refer to  Oxycodone Screen, Urine result).    Oxycodone Screen, Urine 07/31/2025 Presumptive Negative  Presumptive Negative Final    CUTOFF LEVEL: 100 NG/ML  This test will accurately detect both oxycodone and oxymorphone.    PCP Screen, Urine 07/31/2025 Presumptive Negative  Presumptive Negative Final    CUTOFF LEVEL:  25 NG/ML  Cross-reactivity has been reported with dextromethorphan.    Methadone Screen, Urine 07/31/2025 Presumptive Negative  Presumptive Negative Final    CUTOFF LEVEL: 150 NG/ML  The metabolite L-alpha-acetylmethadol (LAAM) is not  detected by this method in concentrations that would  be found in the urine of patients on LAAM therapy.    Acetaminophen 07/31/2025 <10.0  10.0 - 30.0 ug/mL Final    Salicylate  07/31/2025 <3  4 - 20 mg/dL Final    Alcohol 07/31/2025 <10  <=10 mg/dL Final    HCG, Beta-Quantitative 07/31/2025 <2  <5 mIU/mL Final    Color, Urine 07/31/2025 Yellow  Light-Yellow, Yellow, Dark-Yellow Final    Appearance, Urine 07/31/2025 Clear  Clear Final    Specific Gravity, Urine 07/31/2025 1.029  1.005 - 1.035 Final    pH, Urine 07/31/2025 6.0  5.0, 5.5, 6.0, 6.5, 7.0, 7.5, 8.0 Final    Protein,  Urine 07/31/2025 10 (TRACE)  NEGATIVE, 10 (TRACE), 20 (TRACE) mg/dL Final    Glucose, Urine 07/31/2025 Normal  Normal mg/dL Final    Blood, Urine 07/31/2025 NEGATIVE  NEGATIVE mg/dL Final    Ketones, Urine 07/31/2025 40 (2+) (A)  NEGATIVE mg/dL Final    Bilirubin, Urine 07/31/2025 NEGATIVE  NEGATIVE mg/dL Final    Urobilinogen, Urine 07/31/2025 2 (1+) (A)  Normal mg/dL Final    Due to a manufacturing issue, low positive urobilinogen results may be falsely positive. Correlate with urine bilirubin and additional clinical/laboratory findings to assess the risk of hemolytic anemia or liver disease. If clinically indicated, repeat testing with an alternate method is available by contacting the laboratory within 24 hours.    Some pigments and medications may cause a false positive urobilinogen.    Nitrite, Urine 07/31/2025 NEGATIVE  NEGATIVE Final    Leukocyte Esterase, Urine 07/31/2025 75 Odalys/uL (A)  NEGATIVE Final    WBC, Urine 07/31/2025 6-10 (A)  1-5, NONE /HPF Final    RBC, Urine 07/31/2025 NONE  NONE, 1-2, 3-5 /HPF Final    Squamous Epithelial Cells, Urine 07/31/2025 1-9 (SPARSE)  Reference range not established. /HPF Final    Bacteria, Urine 07/31/2025 1+ (A)  NONE SEEN /HPF Final      Imaging  No results found.    Cardiology, Vascular, and Other Imaging  No other imaging results found for the past 7 days       Assessment/Plan   Assessment & Plan  Suicidal ideation    Acute cystitis without hematuria    Becca Joshi is a 39 y.o. female with a past medical history of bipolar disorder, ADHD, anxiety disorder, depression, suicide attempt in the past, CPTSD, fibromyalgia, carpal tunnel syndrome right wrist, pinched nerve at C6 and C7, torn left meniscus, chronic pain syndrome, tobacco use disorder and other comorbidities who was admitted to the behavioral health unit for suicidal ideation.      Asymptomatic bacteriuria  - Patient is afebrile but she has neutrophilia.  WBC is within normal limit but appears to  be trending up.  - She is also tachycardic  - Start cefuroxime  - Follow-up urine culture    Chronic pain syndrome/fibromyalgia  - Patient to follow-up with pain management as outpatient  - She was supposed to get testing including MRI  - She states that over-the-counter pain meds do not work  - She does her own PT at home to help with the pain.    Anxiety/depression/CPTSD/bipolar disorder with suicidal ideations  - Follow-up with psychiatry  - Continue supportive care    DVT prophylaxis: Ambulation         Tyrell Jung MD         [1]   Family History  Problem Relation Name Age of Onset    Ovarian cancer Maternal Grandmother     [2] nicotine, 1 patch, transdermal, Daily   Followed by  [START ON 9/11/2025] nicotine, 1 patch, transdermal, Daily   Followed by  [START ON 9/25/2025] nicotine, 1 patch, transdermal, Daily  [3]    [4] PRN medications: alum-mag hydroxide-simeth, diphenhydrAMINE **OR** diphenhydrAMINE, haloperidol **OR** haloperidol lactate, haloperidol **OR** haloperidol lactate, hydrOXYzine HCL, ibuprofen, LORazepam **OR** midazolam, nicotine polacrilex, polyethylene glycol

## 2025-08-01 NOTE — GROUP NOTE
Group Topic: Open Recreation   Group Date: 8/1/2025  Start Time: 1600  End Time: 1700  Facilitators: ALESSIO PhamS   Department: [facility] REHAB THERAPY VIRTUAL    Number of Participants: 9   Group Focus: leisure skills and social skills  Treatment Modality: Recreational Therapy   Interventions utilized were Leisure, Are You Complete?, Wii Bowling, Oli Flip, Music, exploration, leisure development, patient education, and support  Purpose: coping skills and other: leisure awareness, social engagement     Name: Becca Joshi YOB: 1985   MR: [id number]      Facilitator: Recreational Therapist  Level of Participation: did not attend  Progress: None  Comments: Patients were gathered and encouraged to engage in a leisure activity. Options included the Nintendo Wii, Oli Flip, coloring, and continuing art projects from earlier group. This group offers an opportunity to work on cognitive skills, promotes positive social interaction, and leisure awareness. Patients were also provided with the “Leisure, Are You Complete?” handout. We discussed the importance of having a variety of leisure interests to be well-rounded in our leisure time. The areas included (improvement, pleasure, socialization, identification, escape, creativity, consumption, spiritual, and fitness). Patients were encouraged to record up to three leisure activities in each area. Patients were also asked to identify their two strongest areas and two areas they could improve upon.    Patient declined invitation to group activity at this time. Patient will continue to be provided with opportunities to enhance leisure skills and/or coping mechanisms.    Plan: continue with services

## 2025-08-01 NOTE — CONSULTS
"Nutrition Initial Assessment:   Nutrition Assessment    Reason for Assessment: Admission nursing screening (MST=2; Unintentional weight loss)    Patient is a 39 y.o. female presenting with increased depression & suicidal ideation.     Medical History[1]    Nutrition History:  Food and Nutrient History: Visit made, pt resting in bed at that time. Reports the last time she ate was Tuesday (x3 days ago). Pt states she usually only eats x1 meal/day because she has no appetite & nothing sounds appealing. Has used ONS in the past but not lately. Hesitant but willing to trial variety of ONS to determine preference. No further needs noted at this time.  Vitamin/Herbal Supplement Use: MVI, Calcium, Magnesium, & Tumeric  Food Allergy:  (None)     Anthropometrics:  Height: 162.6 cm (5' 4.02\")   Weight: 65.4 kg (144 lb 2.9 oz)   BMI (Calculated): 24.74  IBW/kg (Dietitian Calculated): 54.5 kg  Percent of IBW: 120 %    Weight History:   [07/31/25] 65.4 kg --> Admit wt, standing scale  [05/23/25] 70.3 kg  [08/19/24] 79.4 kg  [05/21/24] 79.2 kg  [02/20/24] 76.0 kg    Weight Change %:  Weight History / % Weight Change: 7% x ~2 months, 18% x 1 year  Significant Weight Loss: Yes  Interpretation of Weight Loss: >5% in 1 month    Nutrition Focused Physical Exam Findings:  Edema:  Edema:  (N/A)  Physical Findings:  Skin:  (N/A)  Digestive System Findings:  (None)  Mouth Findings:  (None)    Nutrition Significant Labs:  BMP Trend:   Results from last 7 days   Lab Units 07/31/25  0115   GLUCOSE mg/dL 107*   CALCIUM mg/dL 9.0   SODIUM mmol/L 139   POTASSIUM mmol/L 3.6   CO2 mmol/L 23   CHLORIDE mmol/L 106   BUN mg/dL 9   CREATININE mg/dL 0.89      Nutrition Specific Medications:  Scheduled medications  Scheduled Medications[2]  Continuous medications  Continuous Medications[3]  PRN medications  PRN Medications[4]    I/O:   Last BM Date: 07/29/25    Dietary Orders (From admission, onward)       Start     Ordered    07/31/25 2246  Diet Tray " Safety tray  Until discontinued        Question:  Select tray type:  Answer:  Safety tray    07/31/25 2245    07/31/25 1908  Adult diet Regular  Diet effective now        Question:  Diet type  Answer:  Regular    07/31/25 1907             Estimated Needs:   Total Energy Estimated Needs in 24 hours (kCal):  (1650+)  Method for Estimating Needs: ~30 kcals/kg x IBW  Total Protein Estimated Needs in 24 Hours (g):  (55-65)  Method for Estimating 24 Hour Protein Needs: 1.0-1.2 g/kg x IBW  Total Fluid Estimated Needs in 24 Hours (mL):  (1650+)  Method for Estimating 24 Hour Fluid Needs: 1mL/kcal or per team        Nutrition Diagnosis      Nutrition Diagnosis  Patient has Nutrition Diagnosis: Yes  Diagnosis Status (1): New  Nutrition Diagnosis 1: Inadequate protein energy intake  Related to (1): reduced appetite in setting of anxiety/depression  As Evidenced by (1): only consuming ~1 meal/day resulting in a 7% weight loss x 2 months       Nutrition Interventions/Recommendations      Nutrition Recommendations:  Continue liberalized regular diet as tolerated    Recommend a daily MVI w/ minerals given suboptimal PO intake      Nutrition Interventions/Goals:   Medical Food Supplement: Commercial beverage medical food supplement therapy  Goal: Ensure Clear daily (240 kcals/8g protein each) + Ensure Plus HP daily (350 kcals/20g protein each) + Magic Cup daily (290 kcals/9g protein each)      Nutrition Monitoring and Evaluation   Estimated Energy Intake: Energy intake 50 -75% of estimated energy needs    Time Spent (min): 60 minutes            [1] History reviewed. No pertinent past medical history.  [2] cefuroxime, 250 mg, oral, BID  nicotine, 1 patch, transdermal, Daily   Followed by  [START ON 9/11/2025] nicotine, 1 patch, transdermal, Daily   Followed by  [START ON 9/25/2025] nicotine, 1 patch, transdermal, Daily  [3]    [4] PRN medications: alum-mag hydroxide-simeth, diphenhydrAMINE **OR** diphenhydrAMINE, haloperidol **OR**  haloperidol lactate, haloperidol **OR** haloperidol lactate, hydrOXYzine HCL, ibuprofen, LORazepam **OR** midazolam, nicotine polacrilex, polyethylene glycol

## 2025-08-01 NOTE — NURSING NOTE
"Pt interview at the round table by room 206  Pt is wearing her hospital clothes and is looking at her menu  Pt stated she just \" said the wrong thing in front of someone and now she is here\"  Pt just wants to \"go home\"  Pt does not want anything to eat at this time Pt stated her goal \" get sleep\" her strength \" I take care of my cats\" \" I like dena diving\"  and his coping skill \" depends on what it is\"  Pt  rated her anxiety 8-9/10  depression 8/10  and denied everything else at this time  Pt was seen by Dr Jung and then went to bed Pt is appropriate with her answers to the questions at this time   "

## 2025-08-02 LAB
25(OH)D3 SERPL-MCNC: 43 NG/ML (ref 30–100)
BACTERIA UR CULT: NO GROWTH
CHOLEST SERPL-MCNC: 133 MG/DL (ref 0–199)
CHOLESTEROL/HDL RATIO: 4.3
GLUCOSE P FAST SERPL-MCNC: 80 MG/DL (ref 74–99)
HDLC SERPL-MCNC: 30.8 MG/DL
LDLC SERPL CALC-MCNC: 89 MG/DL
NON HDL CHOLESTEROL: 102 MG/DL (ref 0–149)
TRIGL SERPL-MCNC: 67 MG/DL (ref 0–149)
VLDL: 13 MG/DL (ref 0–40)

## 2025-08-02 PROCEDURE — 36415 COLL VENOUS BLD VENIPUNCTURE: CPT | Performed by: PSYCHIATRY & NEUROLOGY

## 2025-08-02 PROCEDURE — 2500000001 HC RX 250 WO HCPCS SELF ADMINISTERED DRUGS (ALT 637 FOR MEDICARE OP): Performed by: INTERNAL MEDICINE

## 2025-08-02 PROCEDURE — 1240000001 HC SEMI-PRIVATE BH ROOM DAILY

## 2025-08-02 PROCEDURE — 2500000001 HC RX 250 WO HCPCS SELF ADMINISTERED DRUGS (ALT 637 FOR MEDICARE OP)

## 2025-08-02 PROCEDURE — 82306 VITAMIN D 25 HYDROXY: CPT | Mod: GEALAB | Performed by: PSYCHIATRY & NEUROLOGY

## 2025-08-02 PROCEDURE — 99233 SBSQ HOSP IP/OBS HIGH 50: CPT | Performed by: PSYCHIATRY & NEUROLOGY

## 2025-08-02 PROCEDURE — 80061 LIPID PANEL: CPT | Performed by: PSYCHIATRY & NEUROLOGY

## 2025-08-02 PROCEDURE — 2500000001 HC RX 250 WO HCPCS SELF ADMINISTERED DRUGS (ALT 637 FOR MEDICARE OP): Performed by: PSYCHIATRY & NEUROLOGY

## 2025-08-02 PROCEDURE — 82947 ASSAY GLUCOSE BLOOD QUANT: CPT | Performed by: PSYCHIATRY & NEUROLOGY

## 2025-08-02 PROCEDURE — 2500000002 HC RX 250 W HCPCS SELF ADMINISTERED DRUGS (ALT 637 FOR MEDICARE OP, ALT 636 FOR OP/ED): Performed by: PSYCHIATRY & NEUROLOGY

## 2025-08-02 RX ORDER — MIRTAZAPINE 15 MG/1
15 TABLET, FILM COATED ORAL NIGHTLY
Status: DISCONTINUED | OUTPATIENT
Start: 2025-08-02 | End: 2025-08-03

## 2025-08-02 RX ADMIN — OXYBUTYNIN CHLORIDE 2.5 MG: 5 TABLET ORAL at 20:28

## 2025-08-02 RX ADMIN — CEFUROXIME AXETIL 250 MG: 250 TABLET ORAL at 20:29

## 2025-08-02 RX ADMIN — HYDROXYZINE HYDROCHLORIDE 50 MG: 25 TABLET, FILM COATED ORAL at 14:56

## 2025-08-02 RX ADMIN — HYDROXYZINE HYDROCHLORIDE 50 MG: 25 TABLET, FILM COATED ORAL at 09:00

## 2025-08-02 RX ADMIN — FLUOXETINE HYDROCHLORIDE 20 MG: 20 CAPSULE ORAL at 08:29

## 2025-08-02 RX ADMIN — CETIRIZINE HYDROCHLORIDE 10 MG: 10 TABLET, FILM COATED ORAL at 09:00

## 2025-08-02 RX ADMIN — HYDROXYZINE HYDROCHLORIDE 50 MG: 25 TABLET, FILM COATED ORAL at 20:28

## 2025-08-02 RX ADMIN — OXYCODONE HYDROCHLORIDE AND ACETAMINOPHEN 500 MG: 500 TABLET ORAL at 09:00

## 2025-08-02 RX ADMIN — CEFUROXIME AXETIL 250 MG: 250 TABLET ORAL at 09:05

## 2025-08-02 RX ADMIN — OXYBUTYNIN CHLORIDE 2.5 MG: 5 TABLET ORAL at 09:00

## 2025-08-02 RX ADMIN — MIRTAZAPINE 15 MG: 15 TABLET, FILM COATED ORAL at 20:28

## 2025-08-02 ASSESSMENT — PAIN - FUNCTIONAL ASSESSMENT
PAIN_FUNCTIONAL_ASSESSMENT: 0-10
PAIN_FUNCTIONAL_ASSESSMENT: 0-10

## 2025-08-02 ASSESSMENT — PAIN SCALES - GENERAL
PAINLEVEL_OUTOF10: 7
PAINLEVEL_OUTOF10: 7

## 2025-08-02 NOTE — NURSING NOTE
"Pt interview in the patients room  Pt is wearing her hospital clothes and stated her day was \"it was a day\"  Pt is ready to go to bed  Pt does not take her medications with water Pt rated her anxiety 9/10  depression 8/10  pain 6/10 in her neck and upper back ( pt did not want any medications for it)  and denied everything else at this time  Pt stated her goal \"get sleep\" her strength \" I take care of my cats\"  and her coping skill \" just ignore it\" Pt is appropriate with her answers to the questions at this time   "

## 2025-08-02 NOTE — ASSESSMENT & PLAN NOTE
Likely multifactorial 2/2 sleep apnea, depression, pain.  Plan:  - Start mirtazapine 7.5 mg nightly PRN insomnia (pt was prescribed this outpt but had not started it yet)  Change Remeron from 7.5mg at bedtime prn to 15mg at bedtime, pt reports she hasn't tried this med before. Okay to try.

## 2025-08-02 NOTE — ASSESSMENT & PLAN NOTE
#Persistent depressive disorder with intermittent depressive episodes with current episode     Chronic history of depressed mood, thoughts of death, feelings of worthlessness, poor concentration since adolescence. No clear history of manic or psychotic sx elicited.     Pt endorses worsening sadness/depressed mood, decreased interest in activities, poor sleep, subjective weight loss, feelings of worthlessness/hopelessness over the last year, and low energy particularly over the last few months. She presents as dysphoric and with mild psychomotor slowing. Per documentation pt's friend has also noticed her making more frequent comments about wishing she were dead over the last few months.    Pt endorses passive death wish but denies suicidal ideation, intent, or plan.   Pt has gun at home but denies access to it.    Plan:  - Hospitalization for safety and supportive therapy  Continue home meds:  Fluoxetine 20 mg daily for depression   - Consider increasing fluoxetine given lack of response after 1 month on starting dose   - Check TSH   Change Remeron from 7.5mg at bedtime prn to 15mg at bedtime, pt reports she hasn't tried this med before. Okay to try.

## 2025-08-02 NOTE — CARE PLAN
"The patient's goals for the shift include \"go to group\"    The clinical goals for the shift include medication compliance    Over the shift, the patient did not make progress toward the following goals. Barriers to progression include lack of medication knowledge. Recommendations to address these barriers include more education on medications.    "

## 2025-08-02 NOTE — GROUP NOTE
Group Topic: Chemical Dependency - Dual Diagnosis   Group Date: 8/2/2025  Start Time: 1400  End Time: 1515  Facilitators: CLAYTON Basurto   Department: [facility] REHAB THERAPY VIRTUAL    Number of Participants: 10   Group Focus: anxiety, chemical dependency education, chemical dependency issues, coping skills, dual diagnosis, and psychiatric education  Treatment Modality: Recreation Therapy  Interventions utilized were: Dual Diagnosis-Anxiety and Addiction, clarification, exploration, patient education, and support  Purpose: coping skills, feelings, irrational fears, insight or knowledge, and relapse prevention strategies    Name: Becca Joshi YOB: 1985   MR: [id number]      Facilitator: Recreational Therapist  Level of Participation: attended briefly, left, refused   Quality of Participation: passive, quiet, withdrawn, and avoidant   Interactions with others: none  Mood/Affect: blunted, closed / guarded, and avoidant   Triggers (if applicable): N/A  Cognition: selectively attentive   Progress: None  Comments: This session involved education on anxiety and addiction related illnesses.  We discussed types of anxiety illnesses, general information about addiction, coping strategies, and specific treatment options/methods. Participants were provided a recovery activity to complete independently that provides a questionnaire related to anxiety disorders. Participants were encouraged to share how they may be able to relate or utilize the information being discussed.      Patient attended less than 15/20 minutes. She sat away from her peers and avoided interactions. Patient appeared attentive briefly and was provided the session handout. She left group for unknown reasons.     Plan: continue with services

## 2025-08-02 NOTE — NURSING NOTE
Pt took her night time medications  Pt laid in bed and did not come out for her snack  Pt went to sleep and slept all night long Pt had an uneventful night  Continue Q 15 minute rounds

## 2025-08-02 NOTE — CARE PLAN
"The patient's goals for the shift include \"I can't make one. I'm stuck here.\"    The clinical goals for the shift include maintain safety, medication adherence, participation in unit activities.     Over the shift, the patient did make progress toward some goals. Barriers to progression include acuity of illness. Recommendations to address these barriers include patient education, and continuation of care plan.              "
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
## 2025-08-02 NOTE — GROUP NOTE
Group Topic: Cognitive Behavioral Therapy   Group Date: 8/2/2025  Start Time: 0930  End Time: 1030  Facilitators: CLAYTON Basurto   Department: [facility] REHAB THERAPY VIRTUAL    Number of Participants: 7   Group Focus: check in, clarity of thought, and coping skills  Treatment Modality: Recreation Therapy  Interventions utilized were: CBT-Untangle Your Thinking-Thought Distortions, clarification, exploration, patient education, and support  Purpose: coping skills, maladaptive thinking, and insight or knowledge    Name: Becca Joshi YOB: 1985   MR: [id number]      Facilitator: Recreational Therapist  Level of Participation: did not attend  Progress: None  Comments: Group included identifying methods of thinking that can be problematic (all or nothing, filtering, catastrophizing, emotional reasoning, labeling, blaming, etc.).  Strategies to untangle these types of thinking were provided which included thought substitution, reality checks, self compassion, shades of gray, substitute terms, and worst case scenario.     Patient remained in their room throughout the session for unknown reasons.     Plan: continue with services
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
## 2025-08-02 NOTE — ASSESSMENT & PLAN NOTE
Plan:  Continue home meds:  Hydroxyzine 50 mg TID     Visit Information Date & Time Provider Department Dept. Phone Encounter #  
 1/4/2017  8:40 AM Delgado Ponce  [facility] [address] [phone] [id number] Follow-up Instructions Return in about 6 months (around 7/4/2017) for bp recheck in 3-6 month pending numbers at home . Upcoming Health Maintenance Date Due HEMOGLOBIN A1C Q6M 12/10/2016 FOOT EXAM Q1 6/10/2017 MICROALBUMIN Q1 6/10/2017 MEDICARE YEARLY EXAM 6/11/2017 EYE EXAM RETINAL OR DILATED Q1 6/20/2017 LIPID PANEL Q1 9/9/2017 GLAUCOMA SCREENING Q2Y 6/20/2018 DTaP/Tdap/Td series (2 - Td) 11/20/2024 Allergies as of 1/4/2017  Review Complete On: 1/4/2017 By: Alison Goodson LPN Severity Noted Reaction Type Reactions Codeine  04/06/2010    Nausea and Vomiting Blurred vision, felt faint Contrast Agent [Iodine]  01/16/2012    Hives Needs premedication w/ Benadryl prn (since 1/2012 reaction) Fish Oil  09/07/2010    Nausea Only Glipizide  02/03/2012    Other (comments) DRASTIC DROP IN BLOOD GLUCOSE, fasting Metformin  09/10/2015    Diarrhea Niacin  09/07/2010    Other (comments) Flushing Norvasc [Amlodipine]  02/22/2012    Other (comments) Leg edema Optiray 350 [Ioversol]  01/09/2012    Hives He does not know what this is Current Immunizations  Reviewed on 12/29/2014 Name Date Influenza High Dose Vaccine PF 10/11/2016 Influenza Vaccine 10/1/2013 Influenza Vaccine Split 10/9/2011 Influenza Vaccine Whole 12/1/2012 Pneumococcal Conjugate (PCV-13) 11/20/2014 Pneumococcal Vaccine (Unspecified Type) 11/1/2003 Tdap 11/20/2014 Not reviewed this visit You Were Diagnosed With   
  
 Codes Comments Essential hypertension    -  Primary ICD-10-CM: I10 
ICD-9-CM: 401.9 Gastroesophageal reflux disease, esophagitis presence not specified     ICD-10-CM: K21.9 ICD-9-CM: 530.81   
 Microalbuminuria     ICD-10-CM: R80.9 ICD-9-CM: 791.0 Impaired fasting glucose     ICD-10-CM: R73.01 
ICD-9-CM: 790.21 Hypertriglyceridemia     ICD-10-CM: E78.1 ICD-9-CM: 272.1 Medicare annual wellness visit, subsequent     ICD-10-CM: Z00.00 ICD-9-CM: V70.0 Vitals BP Pulse Temp Resp Height(growth percentile) Weight(growth percentile) 158/72 82 97.5 °F (36.4 °C) (Oral) 16 6' 1\" (1.854 m) 178 lb (80.7 kg) SpO2 BMI Smoking Status 97% 23.48 kg/m2 Former Smoker Vitals History BMI and BSA Data Body Mass Index Body Surface Area  
 23.48 kg/m 2 2.04 m 2 Preferred Pharmacy Pharmacy Name Phone [facility] [address] [place] [address], [address] [place] [address] [phone] Your Updated Medication List  
  
   
This list is accurate as of: 1/4/17  9:53 AM.  Always use your most recent med list.  
  
  
  
  
 ACCU-CHEK SMARTVIEW TEST STRIP strip Generic drug:  glucose blood VI test strips Patient is to test blood sugar BID Blood-Glucose Meter Misc Commonly known as:  ACCU-CHEK CORA  
1 Package by Does Not Apply route two (2) times a day. colestipol 1 gram tablet Commonly known as:  COLESTID Take 1 Tab by mouth two (2) times a day. FIBERZYME CONCENTRATE-HP PO Take  by mouth two (2) times a day. folic acid-vit K6-NTJ O87 2.5-25-2 mg tablet Commonly known as:  FOLBIC Take 1 Tab by mouth daily. Lancets Misc Commonly known as:  ACCU-CHEK FASTCLIX Patient is to test BID  
  
 lisinopril-hydroCHLOROthiazide 10-12.5 mg per tablet Commonly known as:  Elyn Gell Take 0.5 Tabs by mouth daily. Cancel this rx request, do not fill **  
  
 multivitamin tablet Commonly known as:  ONE A DAY Take 1 Tab by mouth daily. PROBIOTIC ACIDOPHILUS BIOBEADS PO Take  by mouth two (2) times a day. raNITIdine 150 mg tablet Commonly known as:  ZANTAC Take 1 Tab by mouth two (2) times a day. VITRON-C PO Take 2 Tabs by mouth two (2) times a day. Prescriptions Sent to Pharmacy Refills  
 raNITIdine (ZANTAC) 150 mg tablet 3 Sig: Take 1 Tab by mouth two (2) times a day. Class: Normal  
 Pharmacy: [facility] [address], [address] [place] [address] Merle Cox of [address] [place] [address] Ph #: [phone] Route: Oral  
  
Follow-up Instructions Return in about 6 months (around 7/4/2017) for bp recheck in 3-6 month pending numbers at home . To-Do List   
 01/10/2017 8:15 AM  
(Arrive by 8:00 AM) Appointment with Nelli Jordan 2 at [place] [address] ([phone]) General  NPO DIET RESTICTIONS Please be NPO (nothing by mouth) for 6- 8 hours prior to procedure. GENERAL INSTRUCTIONS 1. Bring any non [facility] facility films/reports pertaining to the area being studied with you on the day of appointment. 2. A written order with a valid diagnosis and Physicians signature is required for all scheduled tests. 3. Check in at registration 30 minutes before your appointment time unless you were instructed to do otherwise. Please arrive 15 minutes prior to appointment to register. Introducing [facility]! Dear Julian Morris: Thank you for requesting a [employer] account. Our records indicate that you already have an active [employer] account. You can access your account anytime at [email or web address] Did you know that you can access your hospital and ER discharge instructions at any time in [employer]? You can also review all of your test results from your hospital stay or ER visit. Additional Information If you have questions, please visit the Frequently Asked Questions section of the [employer] website at [email or web address] Remember, [employer] is NOT to be used for urgent needs. For medical emergencies, dial 911. Now available from your iPhone and Android! Please provide this summary of care documentation to your next provider. Your primary care clinician is listed as Chuy Sharma. If you have any questions after today's visit, please call [phone].

## 2025-08-02 NOTE — GROUP NOTE
Group Topic: Gross Motor/Balance Skills   Group Date: 8/2/2025  Start Time: 1100  End Time: 1145  Facilitators: CLAYTON Basurto   Department: [facility] REHAB THERAPY VIRTUAL    Number of Participants: 5   Group Focus: Physical/movement, leisure skills  Treatment Modality: Recreation Therapy  Interventions utilized were: Pop Darts, Music, leisure development  Purpose: Leisure Awareness/Education,     Name: Becca Joshi YOB: 1985   MR: [id number]      Facilitator: Recreational Therapist  Level of Participation: did not attend  Progress: None  Comments: This physical activity “Pop Darts” involved coordinating movements, social interactions, healthy competition, leisure awareness, and a pleasurable experience.     Patient remained in their room throughout the session for unknown reasons.     Plan: continue with services       show

## 2025-08-03 VITALS
BODY MASS INDEX: 24.16 KG/M2 | RESPIRATION RATE: 16 BRPM | OXYGEN SATURATION: 97 % | SYSTOLIC BLOOD PRESSURE: 114 MMHG | DIASTOLIC BLOOD PRESSURE: 80 MMHG | WEIGHT: 141.54 LBS | TEMPERATURE: 97.7 F | HEART RATE: 87 BPM | HEIGHT: 64 IN

## 2025-08-03 PROCEDURE — 2500000001 HC RX 250 WO HCPCS SELF ADMINISTERED DRUGS (ALT 637 FOR MEDICARE OP): Performed by: PSYCHIATRY & NEUROLOGY

## 2025-08-03 PROCEDURE — 2500000001 HC RX 250 WO HCPCS SELF ADMINISTERED DRUGS (ALT 637 FOR MEDICARE OP): Performed by: INTERNAL MEDICINE

## 2025-08-03 PROCEDURE — S4991 NICOTINE PATCH NONLEGEND: HCPCS | Performed by: PSYCHIATRY & NEUROLOGY

## 2025-08-03 PROCEDURE — 2500000002 HC RX 250 W HCPCS SELF ADMINISTERED DRUGS (ALT 637 FOR MEDICARE OP, ALT 636 FOR OP/ED): Performed by: PSYCHIATRY & NEUROLOGY

## 2025-08-03 PROCEDURE — 2500000001 HC RX 250 WO HCPCS SELF ADMINISTERED DRUGS (ALT 637 FOR MEDICARE OP)

## 2025-08-03 PROCEDURE — 99233 SBSQ HOSP IP/OBS HIGH 50: CPT | Performed by: PSYCHIATRY & NEUROLOGY

## 2025-08-03 PROCEDURE — 1240000001 HC SEMI-PRIVATE BH ROOM DAILY

## 2025-08-03 RX ORDER — OLANZAPINE 5 MG/1
5 TABLET, FILM COATED ORAL NIGHTLY
Status: DISCONTINUED | OUTPATIENT
Start: 2025-08-03 | End: 2025-08-04 | Stop reason: HOSPADM

## 2025-08-03 RX ADMIN — HYDROXYZINE HYDROCHLORIDE 50 MG: 25 TABLET, FILM COATED ORAL at 20:31

## 2025-08-03 RX ADMIN — OXYCODONE HYDROCHLORIDE AND ACETAMINOPHEN 500 MG: 500 TABLET ORAL at 08:43

## 2025-08-03 RX ADMIN — OXYBUTYNIN CHLORIDE 2.5 MG: 5 TABLET ORAL at 08:43

## 2025-08-03 RX ADMIN — OLANZAPINE 5 MG: 5 TABLET, FILM COATED ORAL at 20:31

## 2025-08-03 RX ADMIN — CEFUROXIME AXETIL 250 MG: 250 TABLET ORAL at 08:44

## 2025-08-03 RX ADMIN — HYDROXYZINE HYDROCHLORIDE 50 MG: 25 TABLET, FILM COATED ORAL at 08:43

## 2025-08-03 RX ADMIN — CETIRIZINE HYDROCHLORIDE 10 MG: 10 TABLET, FILM COATED ORAL at 08:43

## 2025-08-03 RX ADMIN — CEFUROXIME AXETIL 250 MG: 250 TABLET ORAL at 20:32

## 2025-08-03 RX ADMIN — HYDROXYZINE HYDROCHLORIDE 50 MG: 25 TABLET, FILM COATED ORAL at 15:28

## 2025-08-03 RX ADMIN — FLUOXETINE HYDROCHLORIDE 20 MG: 20 CAPSULE ORAL at 07:58

## 2025-08-03 RX ADMIN — OXYBUTYNIN CHLORIDE 2.5 MG: 5 TABLET ORAL at 20:32

## 2025-08-03 ASSESSMENT — PAIN - FUNCTIONAL ASSESSMENT
PAIN_FUNCTIONAL_ASSESSMENT: 0-10
PAIN_FUNCTIONAL_ASSESSMENT: 0-10

## 2025-08-03 ASSESSMENT — PAIN SCALES - GENERAL
PAINLEVEL_OUTOF10: 6
PAINLEVEL_OUTOF10: 6

## 2025-08-03 NOTE — GROUP NOTE
"Group Topic: Art Creative   Group Date: 8/3/2025  Start Time: 1400  End Time: 1530  Facilitators: CLAYTON Basurto   Department: [facility] REHAB THERAPY VIRTUAL    Number of Participants: 8   Group Focus: Art/creative, leisure skills, mindfulness, and relaxation  Treatment Modality: Recreation Therapy  Interventions utilized were: Ceramics (painting), Music, (other: free paint, foil art, mosaic images), leisure development, reminiscence, and story telling  Purpose: Leisure Awareness, Creative/Social Stimulation, Relaxation, coping skills    Name: Becca Joshi YOB: 1985   MR: [id number]      Facilitator: Recreational Therapist  Level of Participation: attended/did not participate, refused  Quality of Participation: passive, quiet, and withdrawn  Interactions with others: avoidant, minimal  Mood/Affect: tearful, closed / guarded and irritable  Triggers (if applicable): N/A  Cognition: capable  Progress: None  Comments: Session included creative activities which were offered for 90 minutes.    Patient sat near the group (unit couch) and appeared to be reading during most of the session. She was quiet and withdrawn. Patient declined all encouragement to try one of the offered art projects. As the session was concluding she joined in a conversation related to \"being here in the hospital and returning back to the community\". Patient stated, \"What if the hospital is making me/things worse?\". She was validated and attempted to be comforted but continued to be withdrawn after this comment.     Plan: continue with services      "

## 2025-08-03 NOTE — NURSING NOTE
"Met with patient in her room early this morning for routine shift assessment. Patient in bed, lying on right side. Rated anxiety at 7 of 10. Rated depression at 8 of 10, emphasizing that the depression will \"never go away\" and has been there for most of her life. Denied thoughts of harm to self or others, denied SI or HI and contracted for safety. Denied hallucinations. Endorsed pain in left knee, which she described as \"a torn meniscus,\" explained it is chronic and rated at 6 of 10. She declined offer of PRN pain medication.     Described sleep as \"woke up 6 or 7 or 8 times.\" Described mood as \"not great.\"    Coping skills: \"I don't know.\"  Strengths: \"I can't think of anything.\"  Goals: \"I don't know.\"    Medication adherent (with the exception of nicoderm patch and eye drops, declining both). No PRN meds given (PRN pain med offered, but patient declined).     Present at all groups today. Spent time between groups reading a book. Generally isolative to self - was not observed interacting with peers.     Early this afternoon was observed as tearful while in her room.     Q15 minute checks maintained for safety, location, and accountability.   "

## 2025-08-03 NOTE — ASSESSMENT & PLAN NOTE
Likely multifactorial 2/2 sleep apnea, depression, pain.  Plan:  See above  Olanzapine: pt reported she tried this when she was 16 yrs briefly. Couldn't recall if it was helpful or Side effects. Willing to try. Start 5mg at bedtime 8/3/25 for combination tx with Floxetine for refractory depression

## 2025-08-03 NOTE — GROUP NOTE
Group Topic: Gross Motor/Balance Skills   Group Date: 8/3/2025  Start Time: 1100  End Time: 1200  Facilitators: ALESSIO BasurtoS   Department: [facility] REHAB THERAPY VIRTUAL    Number of Participants: 8   Group Focus: Physical/Movement, leisure skills  Treatment Modality: Recreation Therapy  Interventions utilized were: Bocce Ball, Music, leisure development  Purpose: Leisure Awareness/Education, Physical Leisure, Social Stimulation, Pleasurable Activity    Name: Becca Joshi YOB: 1985   MR: [id number]      Facilitator: Recreational Therapist  Level of Participation: attended/did not participate, refused  Progress: None  Comments: This physical activity “Bocce Ball” involved coordinating movements, social interactions, healthy competition, leisure awareness, and a pleasurable experience.     Patient attended the group and spectated her peers. She was encouraged to try the physical activity and declined. Patient was reading a book throughout this session.     Plan: continue with services

## 2025-08-03 NOTE — GROUP NOTE
Group Topic: Community   Group Date: 8/3/2025  Start Time: 0930  End Time: 1020  Facilitators: ALESSIO BasurtoS   Department: [facility] REHAB THERAPY VIRTUAL    Number of Participants: 7   Group Focus: check in, community group, daily focus, and leisure skills  Treatment Modality: Recreation Therapy  Interventions utilized were: Community Meeting, PRASANTH Attack, exploration, leisure development, mental fitness, and orientation  Purpose: Unit/Program Improvements, Leisure Awareness, insight or knowledge    Name: Becca Joshi YOB: 1985   MR: [id number]      Facilitator: Recreational Therapist  Level of Participation: minimal, attended/did not participate  Quality of Participation: passive, quiet, and withdrawn  Interactions with others: none, minimal  Mood/Affect: blunted, closed / guarded, and flat  Triggers (if applicable): N/A  Cognition: selectively attentive  Progress: Minimal  Comments: This session included providing participants an opportunity to understand unit guidelines, rules, expectations, and provide a healthy environment to give suggestions for unit improvements. Community meeting questionnaire slips were passed out and collected. CTRS prioritized suggestions to discuss during this session.  The group also included peers verbalizing suggestions while CTRS recorded meeting minutes.  A leisure activity was played at the end of the group for 15 to 20 minutes.      Ms. Joshi attended the entire session. She sat quietly with her head down and/or reading a book throughout most of the session. She did not provide any unit related feedback or suggestions. She declined the leisure activity and changed seats allowing a peer to be more connected and closer to the activity. During the leisure activity the patient was observed reading. After group concluded she asked this [facility] CTRS about crafts outside of group. She was told about the independent crafts and that a creative group  "will be later today. She identified that her \"preferred medium isn't paint\". Patient did not describe any creative activities she may enjoy when asked. Later she was attempted to be shown sticker puzzles and foil art and declined looking at them.     Plan: continue with services      "

## 2025-08-03 NOTE — PROGRESS NOTES
"Becca Joshi is a 39 y.o. female on day 3 of admission presenting with Suicidal ideation.      Subjective   The patient was seen and examined, discussed in team report this morning. Reviewed chart and vital signs overnight.  Reviewed history and physical. Reviewed previous notes. Discussed with nursing staff.   No agitated behavioral issues reported. Isolated in her room. Did attend two groups this morning.  Pt slept 8.5 hours last night broken.     Per nightshift nurse  Pt interview in the patients room Pt is wearing her hospital clothes and stated her day was \"It was a day\" Pt said she did come out for a group Pt stated her goal \" get sleep\" her strength \" I like to dena dive\" and her coping skill \" I deal with it\" Pt rated her anxiety 9/10 depression 8/10 pain 7/10 in her back ( pt did not want anything for it) and denied everything else at this time Pt is appropriate with her answers to the questions at this time   Pt took her night time medications Pt did not require any prn medications to help her sleep Pt slept all night and had an uneventful night Continue Q 15 minute rounds     This morning on evaluation, the Pt reports she didn't sleep good last night with multiple wakeups.   Pt reports she has been depressed her whole life, constant permanent, failed many medications trials, is considering TMS. Pt reports frustration, irritability, feeling helpless, hopeless. Currently pt denies SI/HI, though she is feeling hopeless, with intermittent crying spells. She rates her depression at 8/10 and anxiety at 7/10.   no delusion, AH/VH elicited.     Notably pt attended two groups this morning. First time since admission    Pt is compliant with medications, patient denied drug side effects. Will continue to monitor       Per admission evaluation 8/1/25 by Dr. Venegas  The reason for admission includes: stating she wanted to die.  Onset of symptoms was gradual starting 1 year ago with unchanged course since that " "time. Psychosocial Stressors: occupational  Pt reports that her business was broken into and she made a comment to the police that she wanted to die, so she was sent to the ED.   Pt reports chronic sadness, low mood, fear, anxiety, and thoughts of death, starting around age 12-13 without clear trigger. These sx are also exacerbated by physical pain due to multiple injuries sustained in the last few years (pinched nerve from skydiving incident, bruised tailbone from fall down stairs). Pt states depressive sx have been worse in the last year. 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 Sx worsened further 2-3 months ago during tax season, when she was severely sleep deprived and still overwhelmed with work to the point of having panic attacks. Pt reports sleeping poorly due to untreated sleep apnea and pain. Endorses chronic poor appetite due to not enjoying food and recent subjective weight loss. 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    FAMILY HISTORY:  No formal diagnoses  Maternal grandfather likely alcohol use disorder  Mother \"narcissist\"   Biological father \"probably Asperger\"    Objective     Last Recorded Vitals  Blood pressure 119/82, pulse 96, temperature 36.4 °C (97.5 °F), temperature source Temporal, resp. rate 16, height 1.626 m (5' 4.02\"), weight 64.2 kg (141 lb 8.6 oz), SpO2 97%.    Sleep Log  Estimated \"Sleeping\" Durations   Night Est. Hours   07/31 10.50-12.25   08/01 14.50-17.00   08/02 10.75-13.25   08/03 0.00      MSE:                                                                                                              General: 40 yo female with reported hx bipolar disorder, CPTSD, anxiety, and ADHD presenting " "to ED for depression and SI after expressing wanting to die to police.  Appearance: Appears stated age, dressed in hospital attire, slightly disheveled, appropriate grooming and hygiene. Multiple piercings.  Attitude:  Withdrawn, labile, defensive, passively cooperative  Behavior:  Poor-fair eye contact  Movement: +psychomotor slowing, No psychomotor agitation. No EPS/TD. Normal gait and station. Normal muscle tone/bulk.  Speech and language: Soft, decreased prosody. Regular rate, fluent.   Mood: depressed her whole life, \"constant permanen\" \"miserable\" , today rates her depression at 8/10 and anxiety at 7/10.   Affect: dysphoric, not tearful today, less irritable today,   Thought process:  linear, organized  Thought content:  +chronic death wish; + themes of hopelessness, apathy, Does not endorse suicidal ideation or homicidal ideations, no delusions or paranoia elicited.  Perception: Does not endorse auditory/visual/gustatory/olfactory/tactile hallucinations. Does not appear to be responding to hallucinatory stimuli.   Cognition:  alert and oriented x 4, short and long term memory grossly intact,  attention and concentration grossly intact, no language impairment   Insight:  Poor-fair, pt as patient recognizes chronic symptoms of illness but not the severity of recent worsening or need for recommended treatments.   Judgment: poor-fair, making comments of wanting to die to authorities but agreeable to continuing treatment despite not being hopeful it will work     Relevant Results           Scheduled medications  Scheduled Medications[1]  Continuous medications  Continuous Medications[2]  PRN medications  PRN Medications[3]   Assessment & Plan  Suicidal ideation  Persistent depressive disorder  #Persistent depressive disorder with intermittent depressive episodes with current episode     Chronic history of depressed mood, thoughts of death, feelings of worthlessness, poor concentration since adolescence. No clear " history of manic or psychotic sx elicited.     Pt endorses worsening sadness/depressed mood, decreased interest in activities, poor sleep, subjective weight loss, feelings of worthlessness/hopelessness over the last year, and low energy particularly over the last few months. She presents as dysphoric and with mild psychomotor slowing. Per documentation pt's friend has also noticed her making more frequent comments about wishing she were dead over the last few months.    Pt endorses passive death wish but denies suicidal ideation, intent, or plan.   Pt has gun at home but denies access to it.    Plan:  - Hospitalization for safety and supportive therapy  Continue home meds:  Fluoxetine 20 mg daily for depression   - Consider increasing fluoxetine given lack of response after 1 month on starting dose   - Check TSH   Remeron 15mg at bedtime first dose last night, not helpful, c/o frequent wakeups.   Olanzapine: pt reported she tried this when she was 16 yrs briefly. Couldn't recall if it was helpful or Side effects. Willing to try. Start 5mg at bedtime 8/3/25 for combination tx with Floxetine for refractory depression       ADHD (attention deficit hyperactivity disorder)  On adderall xr 20 mg daily with some benefit  Plan:  Continue home meds:  Substitute adderall 10 mg IR BID    Anxiety disorder, unspecified  Plan:  Continue home meds:  Hydroxyzine 50 mg TID    Insomnia  Likely multifactorial 2/2 sleep apnea, depression, pain.  Plan:  See above  Olanzapine: pt reported she tried this when she was 16 yrs briefly. Couldn't recall if it was helpful or Side effects. Willing to try. Start 5mg at bedtime 8/3/25 for combination tx with Floxetine for refractory depression  Sleep apnea  Pt used custom mouthguard in the past and plans to get a new one but has never tried CPAP.   Plan:  - Consider sleep medicine referral                 ELOS: <14 days     Medication consent,  risks, benefits, side effects reviewed for all  ordered meds and patient expressed understanding and consent obtained    Discussed potential risks, benefits, and alternatives to medications with patient, who voiced understanding and consented to the above medications and Tx plan.    I spent 35 minutes in the professional and overall care of this patient including:   preparation to eval patient, reviewing history, performing appropriate evaluation, counseling and educating patient (and/or family/CG), ordering/reviewing medications, ordering/reviewing tests/labs and independently interpreting results and communicating results to patient (and or family/CG), communicating/referring with other HC professionals, documenting clinical information in emr, care coordination        Corine Neff MD PhD             [1] ascorbic acid, 500 mg, oral, Daily  cefuroxime, 250 mg, oral, BID  cetirizine, 10 mg, oral, Daily  FLUoxetine, 20 mg, oral, Daily  hydrOXYzine HCL, 50 mg, oral, TID  ketotifen, 1 drop, Both Eyes, BID  nicotine, 1 patch, transdermal, Daily   Followed by  [START ON 9/11/2025] nicotine, 1 patch, transdermal, Daily   Followed by  [START ON 9/25/2025] nicotine, 1 patch, transdermal, Daily  OLANZapine, 5 mg, oral, Nightly  oxyBUTYnin, 2.5 mg, oral, BID     [2]    [3] PRN medications: alum-mag hydroxide-simeth, amphetamine-dextroamphetamine, diphenhydrAMINE **OR** diphenhydrAMINE, fluticasone, haloperidol **OR** haloperidol lactate, haloperidol **OR** haloperidol lactate, ibuprofen, LORazepam **OR** midazolam, nicotine polacrilex, polyethylene glycol

## 2025-08-03 NOTE — NURSING NOTE
"Pt interview in the patients room  Pt is wearing her hospital clothes and stated her day was \"It was a day\"  Pt said she did come out for a group   Pt stated her goal \" get sleep\" her strength \" I like to dena dive\"  and her coping skill \" I deal with it\"  Pt rated her anxiety 9/10  depression 8/10 pain 7/10 in her back ( pt did not want anything for it)  and denied everything else at this time Pt is appropriate with her answers to the questions at this time  "

## 2025-08-03 NOTE — ASSESSMENT & PLAN NOTE
#Persistent depressive disorder with intermittent depressive episodes with current episode     Chronic history of depressed mood, thoughts of death, feelings of worthlessness, poor concentration since adolescence. No clear history of manic or psychotic sx elicited.     Pt endorses worsening sadness/depressed mood, decreased interest in activities, poor sleep, subjective weight loss, feelings of worthlessness/hopelessness over the last year, and low energy particularly over the last few months. She presents as dysphoric and with mild psychomotor slowing. Per documentation pt's friend has also noticed her making more frequent comments about wishing she were dead over the last few months.    Pt endorses passive death wish but denies suicidal ideation, intent, or plan.   Pt has gun at home but denies access to it.    Plan:  - Hospitalization for safety and supportive therapy  Continue home meds:  Fluoxetine 20 mg daily for depression   - Consider increasing fluoxetine given lack of response after 1 month on starting dose   - Check TSH   Remeron 15mg at bedtime first dose last night, not helpful, c/o frequent wakeups.   Olanzapine: pt reported she tried this when she was 16 yrs briefly. Couldn't recall if it was helpful or Side effects. Willing to try. Start 5mg at bedtime 8/3/25 for combination tx with Floxetine for refractory depression

## 2025-08-03 NOTE — CARE PLAN
"The patient's goals for the shift include \"I don't know.\"    The clinical goals for the shift include Maintain safety, medication adherence, participation in unit activities.    Over the shift, the patient did make progress toward the following goals. Barriers to progression include acuity of illness. Recommendations to address these barriers include patient education, and continuation of care plan.          "

## 2025-08-03 NOTE — NURSING NOTE
Pt took her night time medications  Pt did not require any prn medications to help her sleep Pt slept all night and had an uneventful night  Continue Q 15 minute rounds

## 2025-08-04 VITALS
RESPIRATION RATE: 16 BRPM | SYSTOLIC BLOOD PRESSURE: 100 MMHG | OXYGEN SATURATION: 97 % | DIASTOLIC BLOOD PRESSURE: 69 MMHG | BODY MASS INDEX: 24.16 KG/M2 | WEIGHT: 141.54 LBS | TEMPERATURE: 97.2 F | HEIGHT: 64 IN | HEART RATE: 103 BPM

## 2025-08-04 PROBLEM — R45.851 SUICIDAL IDEATION: Status: RESOLVED | Noted: 2025-07-31 | Resolved: 2025-08-04

## 2025-08-04 PROCEDURE — 2500000002 HC RX 250 W HCPCS SELF ADMINISTERED DRUGS (ALT 637 FOR MEDICARE OP, ALT 636 FOR OP/ED): Performed by: PSYCHIATRY & NEUROLOGY

## 2025-08-04 PROCEDURE — 99239 HOSP IP/OBS DSCHRG MGMT >30: CPT | Performed by: PSYCHIATRY & NEUROLOGY

## 2025-08-04 PROCEDURE — S4991 NICOTINE PATCH NONLEGEND: HCPCS | Performed by: PSYCHIATRY & NEUROLOGY

## 2025-08-04 PROCEDURE — 2500000001 HC RX 250 WO HCPCS SELF ADMINISTERED DRUGS (ALT 637 FOR MEDICARE OP)

## 2025-08-04 PROCEDURE — 2500000001 HC RX 250 WO HCPCS SELF ADMINISTERED DRUGS (ALT 637 FOR MEDICARE OP): Performed by: PSYCHIATRY & NEUROLOGY

## 2025-08-04 PROCEDURE — 97150 GROUP THERAPEUTIC PROCEDURES: CPT | Mod: GO,CO

## 2025-08-04 PROCEDURE — 2500000001 HC RX 250 WO HCPCS SELF ADMINISTERED DRUGS (ALT 637 FOR MEDICARE OP): Performed by: INTERNAL MEDICINE

## 2025-08-04 RX ORDER — IBUPROFEN 200 MG
1 TABLET ORAL DAILY
Qty: 30 PATCH | Refills: 1 | Status: SHIPPED | OUTPATIENT
Start: 2025-08-05 | End: 2025-09-16

## 2025-08-04 RX ORDER — MULTIVIT-MIN/IRON FUM/FOLIC AC 7.5 MG-4
1 TABLET ORAL DAILY
Qty: 30 TABLET | Refills: 0 | Status: SHIPPED | OUTPATIENT
Start: 2025-08-04 | End: 2025-09-03

## 2025-08-04 RX ORDER — CEFUROXIME AXETIL 250 MG/1
250 TABLET ORAL 2 TIMES DAILY
Qty: 7 TABLET | Refills: 0 | Status: SHIPPED | OUTPATIENT
Start: 2025-08-04 | End: 2025-08-08

## 2025-08-04 RX ADMIN — HYDROXYZINE HYDROCHLORIDE 50 MG: 25 TABLET, FILM COATED ORAL at 08:41

## 2025-08-04 RX ADMIN — CEFUROXIME AXETIL 250 MG: 250 TABLET ORAL at 08:42

## 2025-08-04 RX ADMIN — CETIRIZINE HYDROCHLORIDE 10 MG: 10 TABLET, FILM COATED ORAL at 08:41

## 2025-08-04 RX ADMIN — OXYCODONE HYDROCHLORIDE AND ACETAMINOPHEN 500 MG: 500 TABLET ORAL at 08:41

## 2025-08-04 RX ADMIN — OXYBUTYNIN CHLORIDE 2.5 MG: 5 TABLET ORAL at 08:41

## 2025-08-04 RX ADMIN — FLUOXETINE HYDROCHLORIDE 20 MG: 20 CAPSULE ORAL at 08:41

## 2025-08-04 ASSESSMENT — PAIN - FUNCTIONAL ASSESSMENT: PAIN_FUNCTIONAL_ASSESSMENT: 0-10

## 2025-08-04 ASSESSMENT — PAIN SCALES - GENERAL: PAINLEVEL_OUTOF10: 7

## 2025-08-04 NOTE — NURSING NOTE
"Pt interview in the [facility]  Pt is reading a book and eating her snack in the [facility]  Pt stated her day was \" a day\"  She said she went to groups and is wearing hospital clothes  Pt stated her goal \" get sleep\"  her strength \" I care for my cats\"  and her coping skill \" I deal with it\"  Pt rated her anxiety 7/10 depression 8/10  ( I always have depression)  and her coping skill \" I read\"  Pt is appropriate with her answers to the questions at this time  "

## 2025-08-04 NOTE — ASSESSMENT & PLAN NOTE
Likely multifactorial 2/2 sleep apnea, depression, pain.  Plan:  See above  Olanzapine: pt reported she tried this when she was 16 yrs briefly. Couldn't recall if it was helpful or Side effects. Willing to try. Start 5mg at bedtime 8/3/25 for combination tx with Floxetine for refractory depression.   8/4/25 Denies benefit from olanzapine for insomnia

## 2025-08-04 NOTE — NURSING NOTE
Pt took her night time medications  Pt was in the front lounge eating and reading her book  Pt has a new roommate Pt slept all night long She did not require any prn medications and had an uneventful night  Continue Q 15 minute safety rounds

## 2025-08-04 NOTE — GROUP NOTE
"Group Topic: Music Therapy   Group Date: 8/4/2025  Start Time: 0940  End Time: 1030  Facilitators: Julia Arnett   Department: [facility] EXPRESSIVE THER VIRTUAL    Number of Participants: 11   Group Focus: expressive outlet, music therapy, and opportunity for choice/control  Treatment Modality: Music Therapy  Interventions Utilized were: active music engagement, other group singing, varghese analysis, and sharing/discussion    Participants invited to choose songs from a music \"menu\" and sing, play instruments, or actively listen to each selection. Live and recorded music was utilized. Opportunities given to check in throughout.  Name: Becca Joshi YOB: 1985   MR: [id number]      Level of Participation: minimal  Quality of Participation: passive and withdrawn  Interactions with others: appropriate  Mood/Affect: depressed  Cognition, Pre Treatment: attentive  Cognition, Post Treatment: coherent/clear  Progress: Minimal  Plan: continue with services    Pt was present for the full group. She shared that in the past several years, she has preferred to listen to silence, rather than music. Support and validation offered. Pt observed actively listening at times and reading a book at other times.  "

## 2025-08-04 NOTE — PROGRESS NOTES
Occupational Therapy     REHAB Therapy Assessment & Treatment    Patient Name: Becca Joshi  MRN: [id number]  Today's Date: 8/4/2025      Activity Assessment:  Music and Self Expression Group: [phone]  Letting Go/Coping skills Group: [phone]  Positive Self thoughts/Positive Affrimtions Group: [phone]     2/3 Groups attended      Pt arrived to group on time. She did not actively participate however she did sit within the group meeting area listening to the activities and received provided worksheets. Pt would benefit from continued skilled OT services to maximize positive self-awareness, ID and carry over appropriate goals and increase safety for appropriate d/c plan throughout LOS.     Encounter Problems       Encounter Problems (Active)       OT Goals       Pt will explore and ID 1-2 strategies to manage stressors/symptoms of illness/ grief more effectively prior to discharge.         Start:  08/01/25    Expected End:  08/15/25            Pt will ID 2-3 STG and 1-2 LTG, including methods to achieve goals after discharge        Start:  08/01/25    Expected End:  08/15/25            Pt will ID 2 community resources/programs to join/attend after D/C to improve their support system        Start:  08/01/25    Expected End:  08/15/25            Pt will demo/ID ways to improve effectiveness in completing tasks and responsibilities, while focusing attention on the present.        Start:  08/01/25    Expected End:  08/15/25            Pt will demonstrate improved self-esteem and awareness by independently identifying 2-3 personal strengths and 2-3 difficulties faced in daily living.          Start:  08/01/25    Expected End:  08/15/25                     Education Documentation  No documentation found.  Education Comments  No comments found.          Additional Comments:

## 2025-08-04 NOTE — PROGRESS NOTES
SW attended multi disciplinary team meeting today and met with patient to confirm her disposition plans.   Patient will follow up with [facility] , [facility] program. Appointment documented. Patient requests transportation home . Shuttle service set up for 12:30pm today.   NENA also spoke with her DOUGLAS, Valentino Watts to notify him of disposition to home today. He is in agreement with patient return to home. NENA answered his questions. He does not voice any concerns at this time.
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
## 2025-08-04 NOTE — NURSING NOTE
"Patient is cooperative so far this shift, patient remains with flat affect, irritable at times. Her ADL's have increased since this nurse last had patient on Friday. She has come out for breakfast and has increased fluid intake, is even drinking water with medications instead of dry swallowing medications. Anxiety \"7\", depression \"8\", denies SI/HI and AH/VH. Patient endorses 7/10 chronic pain, neck and bilat knees, patient declines any intervention at this time. She has order for discharge later today,  time 1230 by [facility]. Discharge information reviewed with patient, patient verbalized understanding of information. Security bag taken out of safe and reviewed with patient.     1220 Patient taken down to [facility], all belongings in hand including security bag and discharge instructions. Patient being dropped off at home.   "

## 2025-08-04 NOTE — ASSESSMENT & PLAN NOTE
#Persistent depressive disorder with intermittent depressive episodes with current episode     Chronic history of depressed mood, thoughts of death, feelings of worthlessness, poor concentration since adolescence. No clear history of manic or psychotic sx elicited.     Pt endorses worsening sadness/depressed mood, decreased interest in activities, poor sleep, subjective weight loss, feelings of worthlessness/hopelessness over the last year, and low energy particularly over the last few months. She presents as dysphoric and with mild psychomotor slowing. Per documentation pt's friend has also noticed her making more frequent comments about wishing she were dead over the last few months.    Pt endorses passive death wish but denies suicidal ideation, intent, or plan.   Pt has gun at home but denies access to it.    8/2 Remeron 15mg at bedtime, not helpful, c/o frequent wakeups.   8/3 Started olanzapine 5 mg at bedtime for combination tx with Floxetine for refractory depression.  pt reported she tried this when she was 16 yrs briefly. Couldn't recall if it was helpful or Side effects. Willing to try.      Plan:  - Hospitalization for safety and supportive therapy  Continue home meds:  Fluoxetine 20 mg daily for depression   - Consider increasing fluoxetine given lack of response after 1 month on starting dose
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
## 2025-08-04 NOTE — GROUP NOTE
Group Topic: Community   Group Date: 8/4/2025  Start Time: 0730  End Time: 0800  Facilitators: CLAYTON Pham   Department: [facility]    Number of Participants: 7   Group Focus: community group, coping skills, goals, and social skills  Treatment Modality: Recreational Therapy  Interventions utilized were LUBA - Community Resources, Goals, exploration, patient education, and support  Purpose: coping skills, insight or knowledge, and self-care, community resources, goals     Name: Becca Joshi YOB: 1985   MR: [id number]      Facilitator: Recreational Therapist  Level of Participation: minimal  Quality of Participation: cooperative, passive, quiet, and withdrawn  Interactions with others: none  Mood/Affect: closed / guarded and flat  Triggers (if applicable): n/a  Cognition: coherent/clear  Progress: Minimal  Comments: Patients participated in community resources group about LUBA ([employer] [place] [employer]). Patients were provided with information on the program including educational courses and support groups in the community (peer, family, gender specific). Patients were given an opportunity to share about the personal recovery/reason for admission. Discussion was also focused on stigma in mental health, how to improve patient experience while in hospital, and healthy coping skills.    Pt attended this morning, but was quiet/passive, and appeared disinterested in information being presented. She declined to share any thoughts or feedback when given opportunity.     Plan: continue with services

## 2025-08-04 NOTE — CARE PLAN
Problem: Discharge Planning  Goal: Discharge to home or other facility with appropriate resources  Outcome: Met     Problem: Community resource needs  Goal: Patient is receiving increased resource support to enhance ability to remain at home  Outcome: Met

## 2025-08-04 NOTE — ASSESSMENT & PLAN NOTE
On adderall xr 20 mg daily with some benefit  Plan:  Continue home meds:  Substitute adderall 10 mg IR BID PRN

## 2025-08-04 NOTE — CARE PLAN
"The patient's goals for the shift include \"i dont know\"    The clinical goals for the shift include maintain safety    Over the shift, the patient did not make progress toward the following goals. Barriers to progression include acute illness. Recommendations to address these barriers include participation in plan of care  Problem: Pain - Adult  Goal: Verbalizes/displays adequate comfort level or baseline comfort level  Outcome: Progressing     Problem: Safety - Adult  Goal: Free from fall injury  Outcome: Progressing     Problem: Nutrition  Goal: Nutrient intake appropriate for maintaining nutritional needs  Outcome: Progressing   .    "